# Patient Record
Sex: FEMALE | Race: WHITE | Employment: UNEMPLOYED | URBAN - NONMETROPOLITAN AREA
[De-identification: names, ages, dates, MRNs, and addresses within clinical notes are randomized per-mention and may not be internally consistent; named-entity substitution may affect disease eponyms.]

---

## 2020-08-31 LAB
BUN BLDV-MCNC: 19 MG/DL
CALCIUM SERPL-MCNC: 9.8 MG/DL
CHLORIDE BLD-SCNC: 98 MMOL/L
CO2: 32 MMOL/L
CREAT SERPL-MCNC: 1.5 MG/DL
GFR CALCULATED: 37
GLUCOSE BLD-MCNC: 121 MG/DL
POTASSIUM SERPL-SCNC: 4.8 MMOL/L
SODIUM BLD-SCNC: 138 MMOL/L

## 2020-09-01 DIAGNOSIS — R60.9 EDEMA, UNSPECIFIED TYPE: ICD-10-CM

## 2020-09-01 DIAGNOSIS — M32.9 LUPUS (HCC): ICD-10-CM

## 2020-09-01 DIAGNOSIS — N17.9 AKI (ACUTE KIDNEY INJURY) (HCC): ICD-10-CM

## 2020-09-01 RX ORDER — FERROUS SULFATE 325(65) MG
325 TABLET ORAL
COMMUNITY
End: 2020-09-11 | Stop reason: SINTOL

## 2020-09-01 RX ORDER — ATORVASTATIN CALCIUM 40 MG/1
40 TABLET, FILM COATED ORAL DAILY
COMMUNITY

## 2020-09-01 RX ORDER — ACETAMINOPHEN 325 MG/1
650 TABLET ORAL EVERY 6 HOURS PRN
COMMUNITY

## 2020-09-01 RX ORDER — LOSARTAN POTASSIUM 100 MG/1
100 TABLET ORAL DAILY
COMMUNITY

## 2020-09-01 RX ORDER — DICLOFENAC SODIUM 75 MG/1
75 TABLET, DELAYED RELEASE ORAL 2 TIMES DAILY
COMMUNITY
End: 2022-07-07 | Stop reason: ALTCHOICE

## 2020-09-01 RX ORDER — CLOPIDOGREL BISULFATE 75 MG/1
75 TABLET ORAL DAILY
COMMUNITY
End: 2021-11-09 | Stop reason: ALTCHOICE

## 2020-09-01 RX ORDER — FAMOTIDINE 20 MG/1
20 TABLET, FILM COATED ORAL 2 TIMES DAILY
COMMUNITY

## 2020-09-01 RX ORDER — INSULIN GLARGINE 100 [IU]/ML
INJECTION, SOLUTION SUBCUTANEOUS NIGHTLY
COMMUNITY
End: 2021-08-24 | Stop reason: ALTCHOICE

## 2020-09-01 RX ORDER — FUROSEMIDE 40 MG/1
40 TABLET ORAL 2 TIMES DAILY
COMMUNITY
End: 2020-09-11 | Stop reason: ALTCHOICE

## 2020-09-01 RX ORDER — NIFEDIPINE 90 MG/1
90 TABLET, FILM COATED, EXTENDED RELEASE ORAL DAILY
COMMUNITY

## 2020-09-01 RX ORDER — SUCRALFATE 1 G/1
1 TABLET ORAL 2 TIMES DAILY
COMMUNITY

## 2020-09-01 RX ORDER — PANTOPRAZOLE SODIUM 40 MG/1
40 TABLET, DELAYED RELEASE ORAL DAILY
COMMUNITY

## 2020-09-01 RX ORDER — BETAMETHASONE DIPROPIONATE 0.05 %
OINTMENT (GRAM) TOPICAL 2 TIMES DAILY
COMMUNITY

## 2020-09-01 RX ORDER — SPIRONOLACTONE 25 MG/1
25 TABLET ORAL DAILY
COMMUNITY

## 2020-09-03 LAB
BUN BLDV-MCNC: 22 MG/DL
CALCIUM SERPL-MCNC: 9.1 MG/DL
CHLORIDE BLD-SCNC: 104 MMOL/L
CO2: 32 MMOL/L
CREAT SERPL-MCNC: 1.7 MG/DL
GFR CALCULATED: 32
GLUCOSE BLD-MCNC: 248 MG/DL
POTASSIUM SERPL-SCNC: 4.8 MMOL/L
SODIUM BLD-SCNC: 147 MMOL/L

## 2020-09-11 ENCOUNTER — OFFICE VISIT (OUTPATIENT)
Dept: NEPHROLOGY | Age: 60
End: 2020-09-11
Payer: MEDICARE

## 2020-09-11 VITALS
OXYGEN SATURATION: 90 % | TEMPERATURE: 97 F | HEART RATE: 79 BPM | DIASTOLIC BLOOD PRESSURE: 76 MMHG | WEIGHT: 273 LBS | HEIGHT: 61 IN | SYSTOLIC BLOOD PRESSURE: 148 MMHG | RESPIRATION RATE: 18 BRPM | BODY MASS INDEX: 51.54 KG/M2

## 2020-09-11 PROCEDURE — 99213 OFFICE O/P EST LOW 20 MIN: CPT | Performed by: INTERNAL MEDICINE

## 2020-09-11 PROCEDURE — G8417 CALC BMI ABV UP PARAM F/U: HCPCS | Performed by: INTERNAL MEDICINE

## 2020-09-11 PROCEDURE — G8427 DOCREV CUR MEDS BY ELIG CLIN: HCPCS | Performed by: INTERNAL MEDICINE

## 2020-09-11 PROCEDURE — 1036F TOBACCO NON-USER: CPT | Performed by: INTERNAL MEDICINE

## 2020-09-11 PROCEDURE — 2022F DILAT RTA XM EVC RTNOPTHY: CPT | Performed by: INTERNAL MEDICINE

## 2020-09-11 PROCEDURE — 3046F HEMOGLOBIN A1C LEVEL >9.0%: CPT | Performed by: INTERNAL MEDICINE

## 2020-09-11 PROCEDURE — 3017F COLORECTAL CA SCREEN DOC REV: CPT | Performed by: INTERNAL MEDICINE

## 2020-09-11 RX ORDER — FLASH GLUCOSE SENSOR
1 KIT MISCELLANEOUS 4 TIMES DAILY
Qty: 2 EACH | Refills: 12 | Status: SHIPPED | OUTPATIENT
Start: 2020-09-11

## 2020-09-11 RX ORDER — FLASH GLUCOSE SCANNING READER
1 EACH MISCELLANEOUS 4 TIMES DAILY
Qty: 1 DEVICE | Refills: 2 | Status: SHIPPED | OUTPATIENT
Start: 2020-09-11

## 2020-09-11 NOTE — PROGRESS NOTES
[facility]    [address] [facility] [address]  [address], [address]  [phone]       Progress Note    9/11/2020 1:37 PM    Pt Name:    Lamar Portal:    1960  Primary Care Physician:  Ella Issa MD       Chief Complaint:   Chief Complaint   Patient presents with    Follow-Up from Hospital    Chronic Kidney Disease    Diabetes    Hypertension    Nephritis     lupus    Nephropathy    Proteinuria    Obesity        History of Chief Complaint: CKD stage G-IIIB-A1     Subjective:  I last saw the patient in [facility] 08/10/2020. I follow the patient for Chronic Kidney disease stage G-IIIB-A1. Since our last visit the patient has not been hospitalized. The patient is sleeping fairly well at night with 1-2 times per night nocturia. The patient has a good appetite and is remaining active. The patient denied N/V/C/D/SOB/CP. She has no trouble at bladder emptying. She has a lot of arthritic pain. Her last Lupus flair was recent and she has scalp and skin lesions. COVID-19 screening  Fever: none  Cough: none  Exposure: none  Shortness of breath: none    Protein/creatinine ratio:   eGFR: 32 Ml/min      Last six eGFR readings:  Lab Results   Component Value Date    LABGLOM 32 09/03/2020    LABGLOM 37 08/31/2020          Objective:  VITALS:  BP (!) 148/76 (Site: Left Upper Arm, Position: Sitting, Cuff Size: Small Adult)   Pulse 79   Temp 97 °F (36.1 °C)   Resp 18   Ht 5' 1\" (1.549 m)   Wt 273 lb (123.8 kg)   SpO2 90%   BMI 51.58 kg/m²   Weight:   Wt Readings from Last 3 Encounters:   09/11/20 273 lb (123.8 kg)   10/02/15 202 lb 3.2 oz (91.7 kg)   08/21/15 195 lb 3.2 oz (88.5 kg)     Body mass index is 51.58 kg/m². Physical examination    General:  Alert and cooperative with exam  HEENT:  Normocephalic. No lesions nor rashes. DC. EOMI  Neck:   No JVD and no bruits. Thyroid gland is normal  Lungs:  Breathing easily. No rales nor rhonchi.  No cough nor sputum production. Heart[de-identified]            RRR. No murmurs nor rubs. PMI is not enlarged nor displaced. Abdomen:  Soft and non tender. Bowel sounds are active in all four quadrants. Extremities:  No edema  Neurologic:  CN II-XII are intact. No deficits noted. Muscle strength and tone are equal throughout. Skin:                Warm and dry with no rashes. Muscles:         Hand  and leg strength are equal and strong bilaterally. Lab Data      CBC: No results found for: WBC, HGB, HCT, MCV, PLT  BMP:    Lab Results   Component Value Date     09/03/2020     08/31/2020    K 4.8 09/03/2020    K 4.8 08/31/2020     09/03/2020    CL 98 08/31/2020    CO2 32 09/03/2020    CO2 32 08/31/2020    BUN 22 09/03/2020    BUN 19 08/31/2020    CREATININE 1.7 09/03/2020    CREATININE 1.5 08/31/2020    GLUCOSE 248 09/03/2020    GLUCOSE 121 08/31/2020      Hepatic: No results found for: AST, ALT, ALB, BILITOT, ALKPHOS  BNP: No results found for: BNP  Lipids: No results found for: CHOL, HDL  INR: No results found for: INR  URINE: No results found for: NAUR, PROTUR  No results found for: NITRU, COLORU, PHUR, LABCAST, WBCUA, RBCUA, MUCUS, TRICHOMONAS, YEAST, BACTERIA, CLARITYU, SPECGRAV, LEUKOCYTESUR, UROBILINOGEN, BILIRUBINUR, BLOODU, GLUCOSEU, KETUA, AMORPHOUS   Microalbumen/Creatinine ratio:  No components found for: RUCREAT        Medications:    Current Outpatient Medications   Medication Sig Dispense Refill    Albuterol Sulfate (PROAIR HFA IN) Inhale into the lungs      acetaminophen (TYLENOL) 325 MG tablet Take 650 mg by mouth every 6 hours as needed for Pain      betamethasone dipropionate (DIPROLENE) 0.05 % ointment Apply topically 2 times daily Apply topically 2 times daily.       diclofenac sodium (VOLTAREN) 1 % GEL Apply 2 g topically 3 times daily      diclofenac (VOLTAREN) 75 MG EC tablet Take 75 mg by mouth 2 times daily      clonazePAM (KLONOPIN) 0.5 MG tablet Take 0.5 mg by mouth 2 times daily as needed      aspirin 81 MG tablet Take 81 mg by mouth daily.  escitalopram (LEXAPRO) 20 MG tablet Take 20 mg by mouth daily.  HYDROXYCHLOROQUINE SULFATE PO Take 400 mg by mouth daily      atorvastatin (LIPITOR) 40 MG tablet Take 40 mg by mouth daily      clopidogrel (PLAVIX) 75 MG tablet Take 75 mg by mouth daily      pantoprazole (PROTONIX) 40 MG tablet Take 40 mg by mouth daily      losartan (COZAAR) 100 MG tablet Take 100 mg by mouth daily      NIFEdipine (ADALAT CC) 90 MG extended release tablet Take 90 mg by mouth daily      sucralfate (CARAFATE) 1 GM tablet Take 1 g by mouth 4 times daily      spironolactone (ALDACTONE) 25 MG tablet Take 25 mg by mouth 2 times daily      famotidine (PEPCID) 20 MG tablet Take 20 mg by mouth 2 times daily      insulin lispro (HUMALOG) 100 UNIT/ML injection vial Inject into the skin 3 times daily (before meals)      Nystatin 5875815 units CAPS Take by mouth      insulin glargine (LANTUS) 100 UNIT/ML injection vial Inject into the skin nightly      meclizine (ANTIVERT) 25 MG tablet Take 25 mg by mouth as needed      traZODone (DESYREL) 100 MG tablet Take 100 mg by mouth nightly.  levocetirizine (XYZAL) 5 MG tablet Take 5 mg by mouth nightly.  LEVOTHYROXINE SODIUM 200 mg daily 200 mg and 50 mg      gabapentin (NEURONTIN) 300 MG capsule Take 400 mg by mouth 3 times daily.  FISH OIL by Does not apply route.  Multiple Vitamins-Minerals (MULTIVITAMIN PO) Take  by mouth. No current facility-administered medications for this visit. IMPRESSIONS:        Kidney disease: CKD stage G-IIIB-A1  Anemia: Anemia remains stable. No need for an erythrocyte stimulating agent (RAMONA). Bone and mineral metabolism: He has no bone pain. PLAN:  1. We discussed the eGFR today. 2. We will continue all current medications without changes. 3. Luis Armando   4. PLA2R  5. Checking protein/creatinine ratio. 6. c Peptide  7.  We will see the patient

## 2020-09-11 NOTE — PATIENT INSTRUCTIONS
KNOW YOUR KIDNEY NUMBERS    Your kidney speed (eGFR) was 32 ml/min this visit (normal is  ml/min)(Ml/min=milliliters of blood filtered per minute). The higher this number is, the better your kidney function is. Your serum creatinine was 1.7 (normal 0.8-1.2 mg/dl at most labs). The higher this number is, the worse your kidney function is. You are in stage G-IIIB-A1 of chronic kidney disease. Your kidney function has declined as compared to your last visit. Your last eGFR was  35 Ml/Min. Stages of kidney disease    EGFR (estimated glomerular filtration rate)    G-I > 90 ml/min Kidney damage with normal kidney function (blood or protein in the urine)  G-II 60-89 ml/min Normal kidney function with mild damage with or without blood or protein in the urine  G-IIIA 45-59 ml/min Mild to moderate loss of kidney function. G-IIIB 30-44 ml/min Moderate to severe loss of kidney function  G-IV 15-29 ml/min Severe loss of kidney function  G-V < 15 mlmin     May need dialysis or kidney transplant    ACR (urine albumin/creatinine ratio) (Mg/Gm)    A-1      ACR<30 Normal to mildly increased protein in the urine. A-2 ACR  Moderate increase in urine protein loss. A-3 ACR >300 Severe increase in urine protein loss    Our goal is to keep your eGFR going as fast as possible ( ml/min is normal). If your eGFR declines to 15-24 ml/min and stays there without recovery,  we will begin to educate you about dialysis or kidney transplant. We also want to keep the protein in your urine as low as possible. The leading cause of kidney disease in the world is diabetes mellitus. Keep your sugar  as much as possible. The second leading cause is hypertension. Keep Your blood pressure 120-140/70-80 as much as possible. If you need refills, call the office or your drug store. You may call the office any time with any questions or concerns.     DUE TO THE CORONAVIRUS CONCERN, PLEASE LIMIT YOUR TIME IN PUBLIC. 8 Brycee Panfilo Rodriguezidi YOUR HANDS COMPLETELY AND FREQUENTLY. Consuelo Magana

## 2020-11-19 PROBLEM — F41.1 GENERALIZED ANXIETY DISORDER: Status: ACTIVE | Noted: 2020-11-19

## 2020-11-19 PROBLEM — D68.61: Status: ACTIVE | Noted: 2017-09-03

## 2020-11-19 PROBLEM — M79.2 NEURALGIA AND NEURITIS, UNSPECIFIED: Status: ACTIVE | Noted: 2020-11-19

## 2020-11-19 PROBLEM — Z86.73 HISTORY OF CARDIOEMBOLIC CEREBROVASCULAR ACCIDENT (CVA): Status: ACTIVE | Noted: 2020-02-09

## 2020-11-19 PROBLEM — R60.9 PERIPHERAL EDEMA: Status: ACTIVE | Noted: 2020-11-19

## 2020-11-19 PROBLEM — G47.34 IDIOPATHIC SLEEP RELATED NONOBSTRUCTIVE ALVEOLAR HYPOVENTILATION: Status: ACTIVE | Noted: 2020-11-19

## 2020-11-19 PROBLEM — R59.9 ADENOPATHY: Status: ACTIVE | Noted: 2018-10-08

## 2020-11-19 PROBLEM — S00.83XA CONTUSION OF FACE: Status: ACTIVE | Noted: 2020-11-19

## 2020-11-19 PROBLEM — D68.59 HYPERCOAGULABLE STATE (HCC): Status: ACTIVE | Noted: 2017-09-03

## 2020-11-19 PROBLEM — K21.00 GASTRO-ESOPHAGEAL REFLUX DISEASE WITH ESOPHAGITIS: Status: ACTIVE | Noted: 2020-11-19

## 2020-11-19 PROBLEM — F32.A DEPRESSIVE DISORDER: Status: ACTIVE | Noted: 2020-11-19

## 2020-11-19 PROBLEM — J45.20 INTERMITTENT ASTHMA: Status: ACTIVE | Noted: 2020-11-19

## 2020-11-19 PROBLEM — I63.81 THALAMIC INFARCTION (HCC): Status: ACTIVE | Noted: 2017-09-03

## 2020-11-19 PROBLEM — Z87.891 EX-CIGARETTE SMOKER: Status: ACTIVE | Noted: 2020-11-19

## 2020-11-19 PROBLEM — S31.109A OPEN WOUND OF ANTERIOR ABDOMINAL WALL: Status: ACTIVE | Noted: 2020-01-01

## 2020-11-19 PROBLEM — E87.1 HYPONATREMIA: Status: ACTIVE | Noted: 2020-11-19

## 2020-11-19 PROBLEM — Z79.899 LONG-TERM USE OF PLAQUENIL: Status: ACTIVE | Noted: 2017-09-03

## 2020-11-19 PROBLEM — R09.89 CHOKING SENSATION: Status: ACTIVE | Noted: 2020-11-19

## 2020-11-19 PROBLEM — G47.00 INSOMNIA: Status: ACTIVE | Noted: 2020-11-19

## 2020-11-19 PROBLEM — R09.02 HYPOXIA: Status: ACTIVE | Noted: 2020-11-19

## 2020-11-19 PROBLEM — Z79.4 LONG TERM CURRENT USE OF INSULIN (HCC): Status: ACTIVE | Noted: 2020-11-19

## 2020-11-19 PROBLEM — J44.1 ACUTE EXACERBATION OF CHRONIC OBSTRUCTIVE AIRWAYS DISEASE (HCC): Status: ACTIVE | Noted: 2020-11-19

## 2020-11-19 PROBLEM — L65.9 ALOPECIA: Status: ACTIVE | Noted: 2020-02-09

## 2020-11-19 PROBLEM — R59.0 MEDIASTINAL LYMPHADENOPATHY: Status: ACTIVE | Noted: 2020-11-19

## 2020-11-19 PROBLEM — K74.60 CIRRHOSIS OF LIVER (HCC): Status: ACTIVE | Noted: 2020-11-19

## 2020-11-19 PROBLEM — J10.1 INFLUENZA DUE TO INFLUENZA A VIRUS: Status: ACTIVE | Noted: 2020-11-19

## 2020-11-19 PROBLEM — R60.1 GENERALIZED EDEMA: Status: ACTIVE | Noted: 2020-11-19

## 2020-11-19 PROBLEM — M32.9 SLE (SYSTEMIC LUPUS ERYTHEMATOSUS RELATED SYNDROME) (HCC): Status: ACTIVE | Noted: 2017-09-03

## 2020-11-19 PROBLEM — R07.89 ATYPICAL CHEST PAIN: Status: ACTIVE | Noted: 2020-11-19

## 2020-11-19 PROBLEM — R74.8 HIGH SERUM ALKALINE PHOSPHATASE LEVEL: Status: ACTIVE | Noted: 2020-11-19

## 2020-11-19 PROBLEM — N28.9 ACUTE RENAL INSUFFICIENCY: Status: ACTIVE | Noted: 2020-11-19

## 2020-11-19 PROBLEM — R93.89 ABNORMAL CT SCAN, CHEST: Status: ACTIVE | Noted: 2018-10-08
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
## 2020-11-19 PROBLEM — R06.00 DYSPNEA, UNSPECIFIED: Status: ACTIVE | Noted: 2020-11-19

## 2020-11-19 PROBLEM — J18.9 COMMUNITY ACQUIRED PNEUMONIA: Status: ACTIVE | Noted: 2020-11-19

## 2020-11-19 PROBLEM — Z72.0 TOBACCO USER: Status: ACTIVE | Noted: 2020-11-19

## 2020-11-19 PROBLEM — R16.0 LIVER MASS: Status: ACTIVE | Noted: 2020-11-19

## 2020-11-19 PROBLEM — Z78.0 POSTMENOPAUSAL STATE: Status: ACTIVE | Noted: 2020-11-19

## 2020-11-19 PROBLEM — N28.9 DISORDER OF KIDNEY AND URETER, UNSPECIFIED: Status: ACTIVE | Noted: 2020-11-19

## 2020-11-19 PROBLEM — D64.9 ANEMIA: Status: ACTIVE | Noted: 2020-11-19

## 2020-11-19 PROBLEM — M79.2 PERIPHERAL NEUROPATHIC PAIN: Status: ACTIVE | Noted: 2020-11-19
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
## 2021-01-08 LAB
BASOPHILS ABSOLUTE: ABNORMAL
BASOPHILS RELATIVE PERCENT: ABNORMAL
BUN BLDV-MCNC: 16 MG/DL
C-PEPTIDE: 0.7
CALCIUM SERPL-MCNC: 9.4 MG/DL
CHLORIDE BLD-SCNC: 102 MMOL/L
CO2: 30 MMOL/L
CREAT SERPL-MCNC: 1.2 MG/DL
EOSINOPHILS ABSOLUTE: ABNORMAL
EOSINOPHILS RELATIVE PERCENT: ABNORMAL
GFR CALCULATED: 48
GLUCOSE BLD-MCNC: 108 MG/DL
HCT VFR BLD CALC: 36.4 % (ref 36–46)
HEMOGLOBIN: 10 G/DL (ref 12–16)
LYMPHOCYTES ABSOLUTE: ABNORMAL
LYMPHOCYTES RELATIVE PERCENT: ABNORMAL
MCH RBC QN AUTO: ABNORMAL PG
MCHC RBC AUTO-ENTMCNC: ABNORMAL G/DL
MCV RBC AUTO: ABNORMAL FL
MONOCYTES ABSOLUTE: ABNORMAL
MONOCYTES RELATIVE PERCENT: ABNORMAL
NEUTROPHILS ABSOLUTE: ABNORMAL
NEUTROPHILS RELATIVE PERCENT: ABNORMAL
PLATELET # BLD: 264 K/ΜL
PMV BLD AUTO: ABNORMAL FL
POTASSIUM SERPL-SCNC: 4.3 MMOL/L
RBC # BLD: 4.1 10^6/ΜL
SODIUM BLD-SCNC: 141 MMOL/L
WBC # BLD: 10.02 10^3/ML

## 2021-02-19 ENCOUNTER — OFFICE VISIT (OUTPATIENT)
Dept: NEPHROLOGY | Age: 61
End: 2021-02-19
Payer: MEDICARE

## 2021-02-19 VITALS
BODY MASS INDEX: 52.87 KG/M2 | TEMPERATURE: 97.2 F | HEIGHT: 61 IN | HEART RATE: 82 BPM | WEIGHT: 280 LBS | RESPIRATION RATE: 18 BRPM | OXYGEN SATURATION: 91 % | SYSTOLIC BLOOD PRESSURE: 118 MMHG | DIASTOLIC BLOOD PRESSURE: 72 MMHG

## 2021-02-19 DIAGNOSIS — N18.32 STAGE 3B CHRONIC KIDNEY DISEASE (HCC): Primary | ICD-10-CM

## 2021-02-19 PROCEDURE — G8427 DOCREV CUR MEDS BY ELIG CLIN: HCPCS | Performed by: INTERNAL MEDICINE

## 2021-02-19 PROCEDURE — 3017F COLORECTAL CA SCREEN DOC REV: CPT | Performed by: INTERNAL MEDICINE

## 2021-02-19 PROCEDURE — G8417 CALC BMI ABV UP PARAM F/U: HCPCS | Performed by: INTERNAL MEDICINE

## 2021-02-19 PROCEDURE — 99214 OFFICE O/P EST MOD 30 MIN: CPT | Performed by: INTERNAL MEDICINE

## 2021-02-19 PROCEDURE — 1036F TOBACCO NON-USER: CPT | Performed by: INTERNAL MEDICINE

## 2021-02-19 PROCEDURE — G8482 FLU IMMUNIZE ORDER/ADMIN: HCPCS | Performed by: INTERNAL MEDICINE

## 2021-02-19 NOTE — PROGRESS NOTES
[facility]    [address] [facility]  [address], [address] [facility]  [phone]       Progress Note    2/19/2021 2:08 PM    Pt Name:    Gildardo Torres:    1960  Primary Care Physician:  Jody Alarcon MD       Chief Complaint:   Chief Complaint   Patient presents with    Chronic Kidney Disease    Anemia    Diabetes    Hematuria    Hypertension    Nephritis    Lupus     [facility]    Nephropathy    Obesity    Proteinuria        History of Chief Complaint: CKD stage G-IIIB-A1 from: DM, HTN, Obesity, Lupus     Subjective:  I last saw the patient in clinic 09/11/2020. I follow the patient for Chronic Kidney disease stage G-IIIB-A1. Since our last visit the patient has not been hospitalized. The patient is sleeping well at night with 1-2 times per night nocturia. The patient has a good appetite and is remaining active. The patient denied N/V/C/D/SOB/CP. She has no trouble at bladder emptying. PLA2R was 1.8. C peptide was 0.7. She has been receiving IV iron. She still has lupus lesions and is receiving topical therapy and hydroxychloroquine. COVID-19 screening  Fever: none  Cough: none  Exposure: none  Shortness of breath: none    Protein/creatinine ratio: 0.4  eGFR: 48 Ml/min (it was 32 Ml/Min last visit)      Last six eGFR readings:  Lab Results   Component Value Date    LABGLOM 48 01/08/2021    LABGLOM 32 09/03/2020    LABGLOM 37 08/31/2020          Objective:  VITALS:  /72 (Site: Left Upper Arm, Position: Sitting, Cuff Size: Small Adult)   Pulse 82   Temp 97.2 °F (36.2 °C)   Resp 18   Ht 5' 1\" (1.549 m)   Wt 280 lb (127 kg)   SpO2 91%   BMI 52.91 kg/m²   Weight:   Wt Readings from Last 3 Encounters:   02/19/21 280 lb (127 kg)   09/11/20 273 lb (123.8 kg)   10/02/15 202 lb 3.2 oz (91.7 kg)     Body mass index is 52.91 kg/m².      Physical examination    General:  Alert and cooperative with exam HEENT:  Normocephalic. No lesions nor rashes. DC. EOMI  Neck:   No JVD and no bruits. Thyroid gland is normal  Lungs:  Breathing easily. No rales nor rhonchi. No cough nor sputum production. Heart[de-identified]            RRR. No murmurs nor rubs. PMI is not enlarged nor displaced. Abdomen:  Soft and non tender. Bowel sounds are active in all four quadrants. Extremities:  1+ edema  Neurologic:  CN II-XII are intact. No deficits noted. Muscle strength and tone are equal throughout. Skin:                Warm and dry with no rashes. Muscles:         Hand  and leg strength are equal and strong bilaterally.      Lab Data      CBC:   Lab Results   Component Value Date    WBC 10.02 01/08/2021    HGB 10.0 (A) 01/08/2021    HCT 36.4 01/08/2021     01/08/2021     BMP:    Lab Results   Component Value Date     01/08/2021     09/03/2020     08/31/2020    K 4.3 01/08/2021    K 4.8 09/03/2020    K 4.8 08/31/2020     01/08/2021     09/03/2020    CL 98 08/31/2020    CO2 30 01/08/2021    CO2 32 09/03/2020    CO2 32 08/31/2020    BUN 16 01/08/2021    BUN 22 09/03/2020    BUN 19 08/31/2020    CREATININE 1.2 01/08/2021    CREATININE 1.7 09/03/2020    CREATININE 1.5 08/31/2020    GLUCOSE 108 01/08/2021    GLUCOSE 248 09/03/2020    GLUCOSE 121 08/31/2020      Hepatic: No results found for: AST, ALT, ALB, BILITOT, ALKPHOS  BNP: No results found for: BNP  Lipids: No results found for: CHOL, HDL  INR: No results found for: INR  URINE: No results found for: NAUR, PROTUR  No results found for: NITRU, COLORU, PHUR, LABCAST, WBCUA, RBCUA, MUCUS, TRICHOMONAS, YEAST, BACTERIA, CLARITYU, SPECGRAV, LEUKOCYTESUR, UROBILINOGEN, BILIRUBINUR, BLOODU, GLUCOSEU, KETUA, AMORPHOUS   Microalbumen/Creatinine ratio:  No components found for: RUCREAT        Medications:    Current Outpatient Medications   Medication Sig Dispense Refill    Albuterol Sulfate (PROAIR HFA IN) Inhale into the lungs  Continuous Blood Gluc Sensor (FREESTYLE KEILA 14 DAY SENSOR) MISC 1 Units by Does not apply route 4 times daily 2 each 12    Continuous Blood Gluc  (FREESTYLE KEILA 14 DAY READER) RADHA 1 Units by Does not apply route 4 times daily 1 Device 2    HYDROXYCHLOROQUINE SULFATE PO Take 400 mg by mouth daily      acetaminophen (TYLENOL) 325 MG tablet Take 650 mg by mouth every 6 hours as needed for Pain      atorvastatin (LIPITOR) 40 MG tablet Take 40 mg by mouth daily      clopidogrel (PLAVIX) 75 MG tablet Take 75 mg by mouth daily      pantoprazole (PROTONIX) 40 MG tablet Take 40 mg by mouth daily      losartan (COZAAR) 100 MG tablet Take 100 mg by mouth daily      NIFEdipine (ADALAT CC) 90 MG extended release tablet Take 90 mg by mouth daily      betamethasone dipropionate (DIPROLENE) 0.05 % ointment Apply topically 2 times daily Apply topically 2 times daily.  diclofenac sodium (VOLTAREN) 1 % GEL Apply 2 g topically 3 times daily      sucralfate (CARAFATE) 1 GM tablet Take 1 g by mouth 4 times daily      spironolactone (ALDACTONE) 25 MG tablet Take 25 mg by mouth 2 times daily      famotidine (PEPCID) 20 MG tablet Take 20 mg by mouth 2 times daily      insulin lispro (HUMALOG) 100 UNIT/ML injection vial Inject into the skin 3 times daily (before meals)      Nystatin 0864675 units CAPS Take by mouth      insulin glargine (LANTUS) 100 UNIT/ML injection vial Inject into the skin nightly      diclofenac (VOLTAREN) 75 MG EC tablet Take 75 mg by mouth 2 times daily      clonazePAM (KLONOPIN) 0.5 MG tablet Take 0.5 mg by mouth 2 times daily as needed      meclizine (ANTIVERT) 25 MG tablet Take 25 mg by mouth as needed      aspirin 81 MG tablet Take 81 mg by mouth daily.  traZODone (DESYREL) 100 MG tablet Take 100 mg by mouth nightly.  levocetirizine (XYZAL) 5 MG tablet Take 5 mg by mouth nightly.  escitalopram (LEXAPRO) 20 MG tablet Take 20 mg by mouth daily.

## 2021-02-19 NOTE — PATIENT INSTRUCTIONS
DUE TO THE CORONAVIRUS CONCERN, PLEASE LIMIT YOUR TIME IN PUBLIC. 8 Maia Rodriguezidi YOUR HANDS COMPLETELY AND FREQUENTLY. Bob Chiang

## 2021-06-21 PROBLEM — R09.02 HYPOXEMIA: Status: ACTIVE | Noted: 2021-06-21

## 2021-06-21 PROBLEM — R60.9 BODY FLUID RETENTION: Status: ACTIVE | Noted: 2021-06-21

## 2021-06-21 PROBLEM — E03.9 ACQUIRED HYPOTHYROIDISM: Status: ACTIVE | Noted: 2021-06-21

## 2021-06-21 PROBLEM — Z99.81 DEPENDENCE ON SUPPLEMENTAL OXYGEN: Status: ACTIVE | Noted: 2021-06-21

## 2021-06-21 PROBLEM — I50.9 CONGESTIVE HEART FAILURE (HCC): Status: ACTIVE | Noted: 2021-06-21

## 2021-06-21 PROBLEM — K92.2 GASTROINTESTINAL HEMORRHAGE: Status: ACTIVE | Noted: 2021-06-21

## 2021-06-21 PROBLEM — F33.41 RECURRENT MAJOR DEPRESSION IN PARTIAL REMISSION (HCC): Status: ACTIVE | Noted: 2021-06-21

## 2021-06-22 LAB
BASOPHILS ABSOLUTE: ABNORMAL
BASOPHILS RELATIVE PERCENT: ABNORMAL
BUN BLDV-MCNC: 18 MG/DL
CALCIUM SERPL-MCNC: 8.8 MG/DL
CHLORIDE BLD-SCNC: 101 MMOL/L
CO2: 36 MMOL/L
CREAT SERPL-MCNC: 1.5 MG/DL
EOSINOPHILS ABSOLUTE: ABNORMAL
EOSINOPHILS RELATIVE PERCENT: ABNORMAL
GFR CALCULATED: 37
GLUCOSE BLD-MCNC: 212 MG/DL
HCT VFR BLD CALC: 34.4 % (ref 36–46)
HEMOGLOBIN: 9.8 G/DL (ref 12–16)
LYMPHOCYTES ABSOLUTE: ABNORMAL
LYMPHOCYTES RELATIVE PERCENT: ABNORMAL
MCH RBC QN AUTO: ABNORMAL PG
MCHC RBC AUTO-ENTMCNC: ABNORMAL G/DL
MCV RBC AUTO: ABNORMAL FL
MONOCYTES ABSOLUTE: ABNORMAL
MONOCYTES RELATIVE PERCENT: ABNORMAL
NEUTROPHILS ABSOLUTE: ABNORMAL
NEUTROPHILS RELATIVE PERCENT: ABNORMAL
PLATELET # BLD: 315 K/ΜL
PMV BLD AUTO: ABNORMAL FL
POTASSIUM SERPL-SCNC: 4.4 MMOL/L
RBC # BLD: 4.22 10^6/ΜL
SODIUM BLD-SCNC: 141 MMOL/L
WBC # BLD: 12.37 10^3/ML

## 2021-08-24 ENCOUNTER — OFFICE VISIT (OUTPATIENT)
Dept: NEPHROLOGY | Age: 61
End: 2021-08-24
Payer: MEDICARE

## 2021-08-24 VITALS — BODY MASS INDEX: 52.91 KG/M2 | WEIGHT: 280 LBS

## 2021-08-24 DIAGNOSIS — N18.32 STAGE 3B CHRONIC KIDNEY DISEASE (HCC): Primary | ICD-10-CM

## 2021-08-24 PROCEDURE — G8417 CALC BMI ABV UP PARAM F/U: HCPCS | Performed by: INTERNAL MEDICINE

## 2021-08-24 PROCEDURE — 1036F TOBACCO NON-USER: CPT | Performed by: INTERNAL MEDICINE

## 2021-08-24 PROCEDURE — 99214 OFFICE O/P EST MOD 30 MIN: CPT | Performed by: INTERNAL MEDICINE

## 2021-08-24 PROCEDURE — 3017F COLORECTAL CA SCREEN DOC REV: CPT | Performed by: INTERNAL MEDICINE

## 2021-08-24 PROCEDURE — G8428 CUR MEDS NOT DOCUMENT: HCPCS | Performed by: INTERNAL MEDICINE

## 2021-08-24 NOTE — PROGRESS NOTES
[facility]    [address] [facility] [address]  [address], [address]  [phone]       Progress Note  Via Telemedicine    8/24/2021 2:06 PM    Pt Name:    Silvino Pitts  YOB: 1960  Primary Care Physician:  Destiny Lopez MD       Chief Complaint:   Chief Complaint   Patient presents with    Chronic Kidney Disease    Anemia    Diabetes    Hematuria    Hypertension    Lupus     [facility]     Nephritis    Obesity    Proteinuria        History of Chief Complaint: CKD stage G-IIIB-A1 from: DM, HTN, Obesity, Lupus     Subjective:  I last saw the patient in clinic 02/19/2021. I follow the patient for Chronic Kidney disease stage G-IIIB-A1. Since our last visit the patient has not been hospitalized. The patient is sleeping well at night with 1-2 times per night nocturia. The patient has a fair appetite and is remaining active. The patient denied N/V/C/CP. She has no trouble at bladder emptying. She feels weak and short of breath. COVID-19 screening  Fever: none  Cough: none  Exposure: none  Shortness of breath: none    Micro albumin/creatinine ratio: 0.3  eGFR: 37 Ml/min (it was 48 Ml/Min last visit)  SCr: 1.5 Mg/Dl (It was 1.2 Mg/Dl last visit)      Last six eGFR readings:  Lab Results   Component Value Date    LABGLOM 37 06/22/2021    LABGLOM 48 01/08/2021    LABGLOM 32 09/03/2020    LABGLOM 37 08/31/2020          Objective:  VITALS:  Wt 280 lb (127 kg)   BMI 52.91 kg/m²   Weight:   Wt Readings from Last 3 Encounters:   08/24/21 280 lb (127 kg)   02/19/21 280 lb (127 kg)   09/11/20 273 lb (123.8 kg)     Body mass index is 52.91 kg/m². Physical examination    General:  Alert and cooperative with exam  HEENT:  Normal per patient  Neck:              Normal per patient  Lungs:  Short of breath.  No cough  Heart[de-identified]            No chest pain  Abdomen:  Diarrhea for 10-14 days  Extremities:  3+ edema shown per her cell phone  Neurologic:  Normal per patient  Skin: Warm and dry with no rashes. Muscles:         Hand  and leg strength are equal and strong bilaterally. 2021    TELEHEALTH EVALUATION -- Audio/Visual (During TCCIB-66 public health emergency)    HPI:    Art Wen (:  1960) has requested an audio/video evaluation for the following concern(s):    CKD stage G-IIIB-A1    Review of Systems    Prior to Visit Medications    Medication Sig Taking? Authorizing Provider   Albuterol Sulfate (PROAIR HFA IN) Inhale into the lungs Yes Historical Provider, MD   Continuous Blood Gluc Sensor (FREESTYLE KEILA 14 DAY SENSOR) MISC 1 Units by Does not apply route 4 times daily Yes Arjun Robert DO   Continuous Blood Gluc  (FREESTYLE KEILA 14 DAY READER) RADHA 1 Units by Does not apply route 4 times daily Yes Arjun Robert DO   HYDROXYCHLOROQUINE SULFATE PO Take 400 mg by mouth daily Yes Historical Provider, MD   acetaminophen (TYLENOL) 325 MG tablet Take 650 mg by mouth every 6 hours as needed for Pain Yes Historical Provider, MD   atorvastatin (LIPITOR) 40 MG tablet Take 40 mg by mouth daily Yes Historical Provider, MD   clopidogrel (PLAVIX) 75 MG tablet Take 75 mg by mouth daily Yes Historical Provider, MD   pantoprazole (PROTONIX) 40 MG tablet Take 40 mg by mouth daily Yes Historical Provider, MD   losartan (COZAAR) 100 MG tablet Take 100 mg by mouth daily Yes Historical Provider, MD   NIFEdipine (ADALAT CC) 90 MG extended release tablet Take 90 mg by mouth daily Yes Historical Provider, MD   betamethasone dipropionate (DIPROLENE) 0.05 % ointment Apply topically 2 times daily Apply topically 2 times daily.  Yes Historical Provider, MD   diclofenac sodium (VOLTAREN) 1 % GEL Apply 2 g topically 3 times daily Yes Historical Provider, MD   sucralfate (CARAFATE) 1 GM tablet Take 1 g by mouth 4 times daily Yes Historical Provider, MD   spironolactone (ALDACTONE) 25 MG tablet Take 25 mg by mouth 2 times daily Yes Historical Provider, MD   famotidine (PEPCID) 20 MG tablet Take 20 mg by mouth 2 times daily Yes Historical Provider, MD   Nystatin 3217775 units CAPS Take by mouth Yes Historical Provider, MD   diclofenac (VOLTAREN) 75 MG EC tablet Take 75 mg by mouth 2 times daily Yes Historical Provider, MD   clonazePAM (KLONOPIN) 0.5 MG tablet Take 0.5 mg by mouth 2 times daily as needed Yes Historical Provider, MD   meclizine (ANTIVERT) 25 MG tablet Take 25 mg by mouth as needed Yes Historical Provider, MD   aspirin 81 MG tablet Take 81 mg by mouth daily. Yes Historical Provider, MD   traZODone (DESYREL) 100 MG tablet Take 100 mg by mouth nightly. Yes Historical Provider, MD   levocetirizine (XYZAL) 5 MG tablet Take 5 mg by mouth nightly. Yes Historical Provider, MD   escitalopram (LEXAPRO) 20 MG tablet Take 20 mg by mouth daily. Yes Historical Provider, MD   LEVOTHYROXINE SODIUM 200 mg daily 200 mg and 50 mg Yes Historical Provider, MD   gabapentin (NEURONTIN) 300 MG capsule Take 400 mg by mouth 3 times daily. Yes Historical Provider, MD   Multiple Vitamins-Minerals (MULTIVITAMIN PO) Take  by mouth. Yes Historical Provider, MD       Social History     Tobacco Use    Smoking status: Former Smoker     Packs/day: 1.00     Years: 35.00     Pack years: 35.00     Types: Cigarettes     Quit date: 2016     Years since quittin.1    Smokeless tobacco: Never Used   Substance Use Topics    Alcohol use: No     Alcohol/week: 0.0 standard drinks    Drug use:  No            PHYSICAL EXAMINATION:  [ INSTRUCTIONS:  \"[x]\" Indicates a positive item  \"[]\" Indicates a negative item  -- DELETE ALL ITEMS NOT EXAMINED]  Vital Signs: (As obtained by patient/caregiver or practitioner observation)    Blood pressure-  Heart rate- 88   Respiratory rate- 18   Temperature- 96.6 Pulse oximetry-     Constitutional: [x] Appears well-developed and well-nourished [x] No apparent distress      [] Abnormal-   Mental status  [x] Alert and awake  [x] Oriented to person/place/time [x]Able to follow commands      Eyes:  EOM    [x]  Normal  [] Abnormal-  Sclera  [x]  Normal  [] Abnormal -         Discharge [x]  None visible  [] Abnormal -    HENT:   [x] Normocephalic, atraumatic. [] Abnormal   [x] Mouth/Throat: Mucous membranes are moist.     External Ears [x] Normal  [] Abnormal-     Neck: [x] No visualized mass     Pulmonary/Chest: [] Respiratory effort normal.  [x] No visualized signs of difficulty breathing or respiratory distress        [x] Abnormal-      Musculoskeletal:   [x] Normal gait with no signs of ataxia         [x] Normal range of motion of neck        [] Abnormal-       Neurological:        [x] No Facial Asymmetry (Cranial nerve 7 motor function) (limited exam to video visit)          [x] No gaze palsy        [] Abnormal-         Skin:        [x] No significant exanthematous lesions or discoloration noted on facial skin         [] Abnormal-            Psychiatric:       [x] Normal Affect [x] No Hallucinations        [] Abnormal-     Other pertinent observable physical exam findings-     ASSESSMENT/PLAN:  There are no diagnoses linked to this encounter. No follow-ups on file. Eliecer Donovan, was evaluated through a synchronous (real-time) audio-video encounter. The patient (or guardian if applicable) is aware that this is a billable service. Verbal consent to proceed has been obtained within the past 12 months. The visit was conducted pursuant to the emergency declaration under the [address] [place] [address], [address] [place] [address] authority and the [employer] General Act. Patient identification was verified, and a caregiver was present when appropriate. The patient was located in a state where the provider was credentialed to provide care. Total time spent on this encounter: 20 minutes    --Jp Quintero DO on 8/24/2021 at 2:09 PM    An electronic signature was used to authenticate this note.        Lab Data      CBC:   Lab Results   Component Value Date    WBC 12.37 06/22/2021    HGB 9.8 (A) 06/22/2021    HCT 34.4 (A) 06/22/2021     06/22/2021     BMP:    Lab Results   Component Value Date     06/22/2021     01/08/2021     09/03/2020    K 4.4 06/22/2021    K 4.3 01/08/2021    K 4.8 09/03/2020     06/22/2021     01/08/2021     09/03/2020    CO2 36 06/22/2021    CO2 30 01/08/2021    CO2 32 09/03/2020    BUN 18 06/22/2021    BUN 16 01/08/2021    BUN 22 09/03/2020    CREATININE 1.5 06/22/2021    CREATININE 1.2 01/08/2021    CREATININE 1.7 09/03/2020    GLUCOSE 212 06/22/2021    GLUCOSE 108 01/08/2021    GLUCOSE 248 09/03/2020      Hepatic: No results found for: AST, ALT, ALB, BILITOT, ALKPHOS  BNP: No results found for: BNP  Lipids: No results found for: CHOL, HDL  INR: No results found for: INR  URINE: No results found for: NAUR, PROTUR  No results found for: NITRU, COLORU, PHUR, LABCAST, WBCUA, RBCUA, MUCUS, TRICHOMONAS, YEAST, BACTERIA, CLARITYU, SPECGRAV, LEUKOCYTESUR, UROBILINOGEN, BILIRUBINUR, BLOODU, GLUCOSEU, KETUA, AMORPHOUS   Microalbumen/Creatinine ratio:  No components found for: RUCREAT        Medications:    Current Outpatient Medications   Medication Sig Dispense Refill    Albuterol Sulfate (PROAIR HFA IN) Inhale into the lungs      Continuous Blood Gluc Sensor (FREESTYLE KEILA 14 DAY SENSOR) MISC 1 Units by Does not apply route 4 times daily 2 each 12    Continuous Blood Gluc  (FREESTYLE KEILA 14 DAY READER) RADHA 1 Units by Does not apply route 4 times daily 1 Device 2    HYDROXYCHLOROQUINE SULFATE PO Take 400 mg by mouth daily      acetaminophen (TYLENOL) 325 MG tablet Take 650 mg by mouth every 6 hours as needed for Pain      atorvastatin (LIPITOR) 40 MG tablet Take 40 mg by mouth daily      clopidogrel (PLAVIX) 75 MG tablet Take 75 mg by mouth daily      pantoprazole (PROTONIX) 40 MG tablet Take 40 mg by mouth daily      losartan (COZAAR) 100 MG tablet Take 100 mg by mouth daily      NIFEdipine (ADALAT CC) 90 MG extended release tablet Take 90 mg by mouth daily      betamethasone dipropionate (DIPROLENE) 0.05 % ointment Apply topically 2 times daily Apply topically 2 times daily.  diclofenac sodium (VOLTAREN) 1 % GEL Apply 2 g topically 3 times daily      sucralfate (CARAFATE) 1 GM tablet Take 1 g by mouth 4 times daily      spironolactone (ALDACTONE) 25 MG tablet Take 25 mg by mouth 2 times daily      famotidine (PEPCID) 20 MG tablet Take 20 mg by mouth 2 times daily      Nystatin [id number] units CAPS Take by mouth      diclofenac (VOLTAREN) 75 MG EC tablet Take 75 mg by mouth 2 times daily      clonazePAM (KLONOPIN) 0.5 MG tablet Take 0.5 mg by mouth 2 times daily as needed      meclizine (ANTIVERT) 25 MG tablet Take 25 mg by mouth as needed      aspirin 81 MG tablet Take 81 mg by mouth daily.  traZODone (DESYREL) 100 MG tablet Take 100 mg by mouth nightly.  levocetirizine (XYZAL) 5 MG tablet Take 5 mg by mouth nightly.  escitalopram (LEXAPRO) 20 MG tablet Take 20 mg by mouth daily.  LEVOTHYROXINE SODIUM 200 mg daily 200 mg and 50 mg      gabapentin (NEURONTIN) 300 MG capsule Take 400 mg by mouth 3 times daily.  Multiple Vitamins-Minerals (MULTIVITAMIN PO) Take  by mouth. No current facility-administered medications for this visit. IMPRESSIONS:        Kidney disease: CKD stage G-IIIB-A1  Diarrhea  Acute kidney injury  Anemia: Anemia remains stable. No need for an erythrocyte stimulating agent (RAMONA). Bone and mineral metabolism: There is no complaint of bone pain. PLAN:  1. We discussed the eGFR today. 2. We will continue all current medications without changes. 3. She will go directly to ER to evaluate acute kidney injury and diarrhea  4. Checking urine microalbumin/creatinine ratio. 5. We will see the patient back in 2 months.       _________________________________  Aleksey [place].  Nikita Curry, DO  [facility]      Vivian Goldberg, MD

## 2021-08-24 NOTE — PATIENT INSTRUCTIONS

## 2021-10-20 LAB
BASOPHILS ABSOLUTE: ABNORMAL
BASOPHILS RELATIVE PERCENT: ABNORMAL
BUN BLDV-MCNC: 14 MG/DL
CALCIUM SERPL-MCNC: 8.5 MG/DL
CHLORIDE BLD-SCNC: 103 MMOL/L
CO2: 29 MMOL/L
CREAT SERPL-MCNC: 1.2 MG/DL
CREATININE, URINE: 70
EOSINOPHILS ABSOLUTE: ABNORMAL
EOSINOPHILS RELATIVE PERCENT: ABNORMAL
GFR CALCULATED: 48
GLUCOSE BLD-MCNC: 231 MG/DL
HCT VFR BLD CALC: 34.3 % (ref 36–46)
HEMOGLOBIN: 9.5 G/DL (ref 12–16)
LYMPHOCYTES ABSOLUTE: ABNORMAL
LYMPHOCYTES RELATIVE PERCENT: ABNORMAL
MCH RBC QN AUTO: ABNORMAL PG
MCHC RBC AUTO-ENTMCNC: ABNORMAL G/DL
MCV RBC AUTO: ABNORMAL FL
MICROALBUMIN/CREAT 24H UR: 8.3 MG/G{CREAT}
MICROALBUMIN/CREAT UR-RTO: 11.9
MONOCYTES ABSOLUTE: ABNORMAL
MONOCYTES RELATIVE PERCENT: ABNORMAL
NEUTROPHILS ABSOLUTE: ABNORMAL
NEUTROPHILS RELATIVE PERCENT: ABNORMAL
PLATELET # BLD: 269 K/ΜL
PMV BLD AUTO: ABNORMAL FL
POTASSIUM SERPL-SCNC: 4.9 MMOL/L
RBC # BLD: 4.02 10^6/ΜL
SODIUM BLD-SCNC: 139 MMOL/L
WBC # BLD: 6.84 10^3/ML

## 2021-11-09 ENCOUNTER — OFFICE VISIT (OUTPATIENT)
Dept: NEPHROLOGY | Age: 61
End: 2021-11-09
Payer: MEDICARE

## 2021-11-09 VITALS
RESPIRATION RATE: 18 BRPM | HEART RATE: 86 BPM | DIASTOLIC BLOOD PRESSURE: 64 MMHG | HEIGHT: 61 IN | BODY MASS INDEX: 43.23 KG/M2 | WEIGHT: 229 LBS | OXYGEN SATURATION: 92 % | SYSTOLIC BLOOD PRESSURE: 118 MMHG

## 2021-11-09 DIAGNOSIS — K92.1 BLOOD IN STOOL: ICD-10-CM

## 2021-11-09 DIAGNOSIS — N18.32 STAGE 3B CHRONIC KIDNEY DISEASE (HCC): Primary | ICD-10-CM

## 2021-11-09 PROCEDURE — G8484 FLU IMMUNIZE NO ADMIN: HCPCS | Performed by: INTERNAL MEDICINE

## 2021-11-09 PROCEDURE — 3017F COLORECTAL CA SCREEN DOC REV: CPT | Performed by: INTERNAL MEDICINE

## 2021-11-09 PROCEDURE — 99214 OFFICE O/P EST MOD 30 MIN: CPT | Performed by: INTERNAL MEDICINE

## 2021-11-09 PROCEDURE — G8427 DOCREV CUR MEDS BY ELIG CLIN: HCPCS | Performed by: INTERNAL MEDICINE

## 2021-11-09 PROCEDURE — G8417 CALC BMI ABV UP PARAM F/U: HCPCS | Performed by: INTERNAL MEDICINE

## 2021-11-09 PROCEDURE — 1036F TOBACCO NON-USER: CPT | Performed by: INTERNAL MEDICINE

## 2021-11-09 RX ORDER — TRIAMCINOLONE ACETONIDE 0.25 MG/G
CREAM TOPICAL
Qty: 45 G | Refills: 5 | Status: SHIPPED | OUTPATIENT
Start: 2021-11-09

## 2021-11-09 NOTE — PROGRESS NOTES
[facility]    [address] [facility] [address]  [address], [address]  [phone]       Progress Note    11/9/2021 1:51 PM    Pt Name:    Reinaldo Bradshaw:    1960  Primary Care Physician:  Carine Juarez MD       Chief Complaint:   Chief Complaint   Patient presents with    Chronic Kidney Disease    Diabetes    Anemia    Hypertension    Lupus     [facility]    Nephropathy    Obesity    Proteinuria    Other     chronic, watery diarrhea    Other     recent COVID-19 with hospitilization. History of Chief Complaint: CKD stage G-IIIB-A1 from: DM, HTN, Obesity, Lupus     Subjective:  I last saw the patient in clinic 08/24/2021. I follow the patient for Chronic Kidney disease stage G-IIIB-A1. Since our last visit the patient has been hospitalized with COVID-19 at [facility]. She had blood stools and anemia requiring 2 unit PRBC. The patient is sleeping well at night with 1-2 times per night nocturia. The patient has a good appetite and is remaining active. The patient denied N/V/C/D/SOB/CP. She has no trouble at bladder emptying. She showed me lesions on her scalp and eyebrows that fluoresced under my pocket black light.  This suggests yeast.    COVID-19 screening  Fever: none  Cough: none  Exposure: none  Shortness of breath: none    Albumin/creatinine ratio:   eGFR: 48 Ml/min (it was 37 Ml/Min last visit)  SCr: 1.2 Mg/Dl (It was 1.5 Mg/Dl last visit)      Last six eGFR readings:  Lab Results   Component Value Date    LABGLOM 48 10/20/2021    LABGLOM 37 06/22/2021    LABGLOM 48 01/08/2021    LABGLOM 32 09/03/2020    LABGLOM 37 08/31/2020          Objective:  VITALS:  /64 (Site: Left Lower Arm, Position: Sitting, Cuff Size: Small Adult)   Pulse 86   Resp 18   Ht 5' 1\" (1.549 m)   Wt 229 lb (103.9 kg)   SpO2 92% Comment: oxygen  BMI 43.27 kg/m²   Weight:   Wt Readings from Last 3 Encounters:   11/09/21 229 lb (103.9 kg)   08/24/21 280 lb (127 kg)   02/19/21 280 lb Medication Sig Dispense Refill    Albuterol Sulfate (PROAIR HFA IN) Inhale into the lungs      Continuous Blood Gluc Sensor (FREESTYLE KEILA 14 DAY SENSOR) MISC 1 Units by Does not apply route 4 times daily 2 each 12    Continuous Blood Gluc  (FREESTYLE KEILA 14 DAY READER) RADHA 1 Units by Does not apply route 4 times daily 1 Device 2    HYDROXYCHLOROQUINE SULFATE PO Take 400 mg by mouth daily      acetaminophen (TYLENOL) 325 MG tablet Take 650 mg by mouth every 6 hours as needed for Pain      atorvastatin (LIPITOR) 40 MG tablet Take 40 mg by mouth daily      pantoprazole (PROTONIX) 40 MG tablet Take 40 mg by mouth daily      losartan (COZAAR) 100 MG tablet Take 100 mg by mouth daily      NIFEdipine (ADALAT CC) 90 MG extended release tablet Take 90 mg by mouth daily      betamethasone dipropionate (DIPROLENE) 0.05 % ointment Apply topically 2 times daily Apply topically 2 times daily.  diclofenac sodium (VOLTAREN) 1 % GEL Apply 2 g topically 3 times daily      sucralfate (CARAFATE) 1 GM tablet Take 1 g by mouth 4 times daily      spironolactone (ALDACTONE) 25 MG tablet Take 25 mg by mouth 2 times daily      famotidine (PEPCID) 20 MG tablet Take 20 mg by mouth 2 times daily      Nystatin [id number] units CAPS Take by mouth      diclofenac (VOLTAREN) 75 MG EC tablet Take 75 mg by mouth 2 times daily      clonazePAM (KLONOPIN) 0.5 MG tablet Take 0.5 mg by mouth 2 times daily as needed      meclizine (ANTIVERT) 25 MG tablet Take 25 mg by mouth as needed      aspirin 81 MG tablet Take 81 mg by mouth daily.  traZODone (DESYREL) 100 MG tablet Take 100 mg by mouth nightly.  levocetirizine (XYZAL) 5 MG tablet Take 5 mg by mouth nightly.  escitalopram (LEXAPRO) 20 MG tablet Take 20 mg by mouth daily.  LEVOTHYROXINE SODIUM 200 mg daily 200 mg and 50 mg      gabapentin (NEURONTIN) 300 MG capsule Take 400 mg by mouth 3 times daily.        Multiple Vitamins-Minerals (MULTIVITAMIN PO) Take  by mouth. No current facility-administered medications for this visit. IMPRESSIONS:      Kidney disease: CKD stage G-IIIB-A1  Recovery from MASSIMO due to dehydration  Anemia: Anemia remains stable. No need for an erythrocyte stimulating agent (RAMONA). Bone and mineral metabolism: Anemia remains stable. No need for an erythrocyte stimulating agent (RAMONA). PLAN:  1. We discussed the eGFR today. 2. We will continue all current medications without changes. 3. Consult Dr. Raffi Lester for blood stool and anemia. 4. Checking protein/creatinine ratio. 5. Triamcinolone cream for scalp rash. 6. We will see the patient back in 6 months.       _________________________________  Sunitha Wilson.  Nicole Gaston DO  [facility]      Kadeem Mccord MD

## 2021-11-09 NOTE — PATIENT INSTRUCTIONS
KNOW YOUR KIDNEY NUMBERS    Your kidney speed (eGFR) was 48 ml/min this visit (normal is  ml/min)(Ml/min=milliliters of blood filtered per minute). The higher this number is, the better your kidney function is. Your serum creatinine was 1.2 (normal 0.8-1.2 mg/dl at most labs). The higher this number is, the worse your kidney function is. You are in stage G-IIIB-A1 of chronic kidney disease. Your kidney function has improved as compared to your last visit. Your last eGFR was  37 Ml/Min. Stages of kidney disease  EGFR (estimated glomerular filtration rate)  G-I > 90 ml/min Kidney damage with normal kidney function (blood or protein in the urine)  G-II 60-89 ml/min Normal kidney function with mild damage with or without blood or protein in the urine  G-IIIA 45-59 ml/min Mild to moderate loss of kidney function. G-IIIB 30-44 ml/min Moderate to severe loss of kidney function  G-IV 15-29 ml/min Severe loss of kidney function  G-V < 15 mlmin     May need dialysis or kidney transplant    ACR (urine albumin/creatinine ratio) (Mg/Gm)  A-1      ACR<30 Normal to mildly increased protein in the urine. A-2 ACR  Moderate increase in urine protein loss. A-3 ACR >300 Severe increase in urine protein loss    Our goal is to keep your eGFR going as fast as possible ( ml/min is normal). If your eGFR declines to 15-24 ml/min and stays there without recovery,  we will begin to educate you about dialysis or kidney transplant. We also want to keep the protein in your urine as low as possible. The leading cause of kidney disease in the world is diabetes mellitus. Keep your sugar  as much as possible. The second leading cause is hypertension. Keep Your blood pressure 120-140/70-80 as much as possible. If you need refills, call the office or your drug store. You may call the office any time with any questions or concerns. We use Epic software to manage your private patient data securely.  Any health care provider or hospital in the world that uses Epic software can see your data if they have the appropriate credentials. DUE TO THE CORONAVIRUS CONCERN, PLEASE LIMIT YOUR TIME IN PUBLIC. [address] YOUR HANDS COMPLETELY AND FREQUENTLY. Darvin Al

## 2022-03-30 LAB
BASOPHILS ABSOLUTE: 0.06 /ΜL
BASOPHILS RELATIVE PERCENT: 0.7 %
BUN BLDV-MCNC: 22 MG/DL
CALCIUM SERPL-MCNC: 8.8 MG/DL
CHLORIDE BLD-SCNC: 96 MMOL/L
CO2: 35 MMOL/L
CREAT SERPL-MCNC: 1.8 MG/DL
EOSINOPHILS ABSOLUTE: 0.21 /ΜL
EOSINOPHILS RELATIVE PERCENT: 2.3 %
GFR CALCULATED: 30
GLUCOSE BLD-MCNC: 254 MG/DL
HCT VFR BLD CALC: 31.3 % (ref 36–46)
HEMOGLOBIN: 8.6 G/DL (ref 12–16)
LYMPHOCYTES ABSOLUTE: 1.24 /ΜL
LYMPHOCYTES RELATIVE PERCENT: 13.5 %
MCH RBC QN AUTO: 22.3 PG
MCHC RBC AUTO-ENTMCNC: 27.4 G/DL
MCV RBC AUTO: 81.2 FL
MONOCYTES ABSOLUTE: 0.55 /ΜL
MONOCYTES RELATIVE PERCENT: 6 %
NEUTROPHILS ABSOLUTE: 7.01 /ΜL
NEUTROPHILS RELATIVE PERCENT: 76.3 %
PLATELET # BLD: 300 K/ΜL
PMV BLD AUTO: 9.9 FL
POTASSIUM SERPL-SCNC: 4 MMOL/L
RBC # BLD: 3.85 10^6/ΜL
SODIUM BLD-SCNC: 141 MMOL/L
WBC # BLD: 9.18 10^3/ML

## 2022-04-11 ENCOUNTER — TELEPHONE (OUTPATIENT)
Dept: NEPHROLOGY | Age: 62
End: 2022-04-11

## 2022-04-11 DIAGNOSIS — N18.32 ANEMIA IN STAGE 3B CHRONIC KIDNEY DISEASE (HCC): ICD-10-CM

## 2022-04-11 DIAGNOSIS — E11.29 DIABETES MELLITUS WITH MICROALBUMINURIA (HCC): ICD-10-CM

## 2022-04-11 DIAGNOSIS — N18.31 ANEMIA IN STAGE 3A CHRONIC KIDNEY DISEASE (HCC): ICD-10-CM

## 2022-04-11 DIAGNOSIS — N18.32 STAGE 3B CHRONIC KIDNEY DISEASE (HCC): Primary | ICD-10-CM

## 2022-04-11 DIAGNOSIS — D63.1 ANEMIA IN STAGE 3A CHRONIC KIDNEY DISEASE (HCC): ICD-10-CM

## 2022-04-11 DIAGNOSIS — R80.9 DIABETES MELLITUS WITH MICROALBUMINURIA (HCC): ICD-10-CM

## 2022-04-11 DIAGNOSIS — D63.1 ANEMIA IN STAGE 3B CHRONIC KIDNEY DISEASE (HCC): ICD-10-CM

## 2022-04-11 DIAGNOSIS — E11.21 DM KIDNEY DISEASE (HCC): ICD-10-CM

## 2022-04-11 NOTE — TELEPHONE ENCOUNTER
Bmp, cbc, PTH, phosphorus, urine microalbumin/creat ratio, iron saturation and ferritin. Her hemoglobin is dropping on labs is she seeing anyone for this?   If not then would like to evaluate it sooner than July so let me know and I will order some labs for sooner as well

## 2022-04-11 NOTE — TELEPHONE ENCOUNTER
Per patient phone call, she used to see Dr. Misa Higuera and has rescheduled her last 2 appointments (Feb. & April) with Dr. Yasemin Horner. She is now scheduled for 7.7.22 but has no lab orders for that visit. If any labs are needed please mail orders to patient.

## 2022-04-12 NOTE — TELEPHONE ENCOUNTER
Check occult stools x 2 and repeat a hemoglobin as well as iron saturation, ferritin, Vitamin V85, and folic acid

## 2022-04-12 NOTE — TELEPHONE ENCOUNTER
Patient aware and labs faxed to [facility]. Spoke to Erlin Brewster, Home Health Nurse. Faxed all orders to [phone].

## 2022-04-12 NOTE — TELEPHONE ENCOUNTER
Spoke with patient. She is not following anyone for her hemoglobin. Please advise. July appt labs were mailed to patient, I told her I would call her back with current orders to be done soon.

## 2022-04-19 LAB
FOLATE: >24
HCT VFR BLD CALC: 33.3 % (ref 36–46)
HCT VFR BLD CALC: 9 % (ref 36–46)
HEMOGLOBIN: 33.3 G/DL (ref 12–16)
HEMOGLOBIN: 9 G/DL (ref 12–16)
IRON: 51
VITAMIN B-12: 511
VITAMIN B-12: 511

## 2022-04-20 ENCOUNTER — TELEPHONE (OUTPATIENT)
Dept: NEPHROLOGY | Age: 62
End: 2022-04-20

## 2022-04-20 DIAGNOSIS — D63.1 ANEMIA IN STAGE 3B CHRONIC KIDNEY DISEASE (HCC): Primary | ICD-10-CM

## 2022-04-20 DIAGNOSIS — N18.32 ANEMIA IN STAGE 3B CHRONIC KIDNEY DISEASE (HCC): Primary | ICD-10-CM

## 2022-04-20 NOTE — TELEPHONE ENCOUNTER
----- Message from Alejandro Dye DO sent at 4/20/2022 11:07 AM EDT -----  Labs reviewed. Arrange for Aranesp 100 mcg SC x 1.  Repeat H&H 1 month  ----- Message -----  From: Steve Mix CMA  Sent: 4/19/2022   4:08 PM EDT  To: Alejandro Dye DO

## 2022-04-21 NOTE — TELEPHONE ENCOUNTER
Faxed order to [facility] they will sechedule.  I put note to schedule same day as aranesp

## 2022-04-21 NOTE — TELEPHONE ENCOUNTER
Just seeing her ferritin level and would also like her to get 1 dose of IV iron can be given same day as Aranesp.    Either Injectafer 750 mg x 1 or If not covered can give Venofer 300 mg IV x 1

## 2022-05-18 ENCOUNTER — TELEPHONE (OUTPATIENT)
Dept: NEPHROLOGY | Age: 62
End: 2022-05-18

## 2022-05-18 DIAGNOSIS — N18.32 ANEMIA IN STAGE 3B CHRONIC KIDNEY DISEASE (HCC): Primary | ICD-10-CM

## 2022-05-18 DIAGNOSIS — D63.1 ANEMIA IN STAGE 3B CHRONIC KIDNEY DISEASE (HCC): Primary | ICD-10-CM

## 2022-05-18 LAB
HCT VFR BLD CALC: 33.9 % (ref 36–46)
HEMOGLOBIN: 9.6 G/DL (ref 12–16)

## 2022-05-18 NOTE — TELEPHONE ENCOUNTER
----- Message from Davon Caldwell DO sent at 5/18/2022  3:24 PM EDT -----  Hgb still low but is improving. Arrange for another dose of aranesp 100 mcg SC x 1. Start oral iron ferrous sulfate 325 mg daily.    Prior to next appt please make sure orders include cbc and iron saturation  ----- Message -----  From: Maury Gregg CMA  Sent: 5/18/2022   3:16 PM EDT  To: Davon Caldwell DO

## 2022-05-18 NOTE — TELEPHONE ENCOUNTER
Spoke to Chantal Ugarte and informed him of faxing aranesp order to [facility]. He told me  will do her oral iron. I am going to fax lab orders to them with the note for oral iron.

## 2022-05-19 NOTE — TELEPHONE ENCOUNTER
Attempted to call patient to inform her that home health does not do it . We went through this on the last dose she had. I will continue to reach out to patient.

## 2022-05-19 NOTE — TELEPHONE ENCOUNTER
Placed call to patient and Amish at [facility] to reach back out to get her scheduled since [facility] does not do it.

## 2022-05-19 NOTE — TELEPHONE ENCOUNTER
Spoke to Amish at [facility] [employer]. She had attempted to schedule patient to receive Aranesp, however patient wanted to have this completed through home health. Amish told her she was uncertain this could be completed but would let us know. Aliza Godwin will hold on to order, if it does need scheduled, we just need to let them  Know.

## 2022-05-19 NOTE — TELEPHONE ENCOUNTER
Patient called back and states she is with a different home health . Xochilt Helton is her CNP. I left a message for Charissa which is the care coordinator asking if they could give it to her in the home setting.

## 2022-05-23 RX ORDER — FERROUS SULFATE 325(65) MG
325 TABLET ORAL
COMMUNITY
End: 2022-05-23 | Stop reason: SDUPTHER

## 2022-05-23 RX ORDER — FERROUS SULFATE 325(65) MG
325 TABLET ORAL
Qty: 90 TABLET | Refills: 3 | Status: SHIPPED | OUTPATIENT
Start: 2022-05-23

## 2022-05-23 RX ORDER — DOCUSATE SODIUM 100 MG/1
100 CAPSULE, LIQUID FILLED ORAL 2 TIMES DAILY
Qty: 30 CAPSULE | Refills: 3 | Status: SHIPPED | OUTPATIENT
Start: 2022-05-23 | End: 2022-06-22

## 2022-05-23 NOTE — TELEPHONE ENCOUNTER
Home health called and asked if we can send these to pharmacy due to insurance will pay for them. Due to the iron causing constipation is why they asked for a stool softner.

## 2022-06-27 LAB
PHOSPHORUS: NORMAL
PTH INTACT: 229.1

## 2022-06-28 ENCOUNTER — TELEPHONE (OUTPATIENT)
Dept: NEPHROLOGY | Age: 62
End: 2022-06-28

## 2022-06-28 LAB
BASOPHILS ABSOLUTE: ABNORMAL
BASOPHILS RELATIVE PERCENT: ABNORMAL
BUN BLDV-MCNC: 20 MG/DL
CALCIUM SERPL-MCNC: 9.4 MG/DL
CHLORIDE BLD-SCNC: 96 MMOL/L
CO2: 37 MMOL/L
CREAT SERPL-MCNC: 1.8 MG/DL
EOSINOPHILS ABSOLUTE: ABNORMAL
EOSINOPHILS RELATIVE PERCENT: ABNORMAL
GFR CALCULATED: 30
GLUCOSE BLD-MCNC: 232 MG/DL
HCT VFR BLD CALC: 32.4 % (ref 36–46)
HEMOGLOBIN: 9 G/DL (ref 12–16)
IRON SATURATION: 10
IRON: 36
LYMPHOCYTES ABSOLUTE: ABNORMAL
LYMPHOCYTES RELATIVE PERCENT: ABNORMAL
MCH RBC QN AUTO: ABNORMAL PG
MCHC RBC AUTO-ENTMCNC: ABNORMAL G/DL
MCV RBC AUTO: ABNORMAL FL
MONOCYTES ABSOLUTE: ABNORMAL
MONOCYTES RELATIVE PERCENT: ABNORMAL
NEUTROPHILS ABSOLUTE: ABNORMAL
NEUTROPHILS RELATIVE PERCENT: ABNORMAL
PLATELET # BLD: 312 K/ΜL
PMV BLD AUTO: ABNORMAL FL
POTASSIUM SERPL-SCNC: 4.3 MMOL/L
RBC # BLD: 3.83 10^6/ΜL
SODIUM BLD-SCNC: 141 MMOL/L
TOTAL IRON BINDING CAPACITY: 346
WBC # BLD: 11.24 10^3/ML

## 2022-06-28 NOTE — TELEPHONE ENCOUNTER
----- Message from Adi Mckeon MD sent at 6/28/2022  3:01 PM EDT -----  Can we see if we can add a ferritin level as well to the labs done today

## 2022-06-29 LAB
CREATININE, URINE: 22.1
FERRITIN: 70 NG/ML (ref 9–150)
MICROALBUMIN/CREAT 24H UR: <3 MG/G{CREAT}
MICROALBUMIN/CREAT UR-RTO: NORMAL

## 2022-06-30 ENCOUNTER — TELEPHONE (OUTPATIENT)
Dept: NEPHROLOGY | Age: 62
End: 2022-06-30

## 2022-06-30 NOTE — TELEPHONE ENCOUNTER
----- Message from Venkata Caldera MD sent at 6/30/2022 11:07 AM EDT -----  Please schedule her to receive 2 doses of IV iron Injectafer

## 2022-06-30 NOTE — TELEPHONE ENCOUNTER
Attempted to contact pt to inform her but a gentlemen answered stating I had the wrong number. Sent order to [facility] to have them schedule. Also reaced out to [facility] to see about another number.

## 2022-07-01 NOTE — TELEPHONE ENCOUNTER
Patient returned call, stated the lady that had called and spoke to Dulce Maria asked for Johny Cunha, which is patient's twin sister. Stated it confused him. She is aware of the order and that trish will be contacting for scheduling.

## 2022-07-07 ENCOUNTER — OFFICE VISIT (OUTPATIENT)
Dept: NEPHROLOGY | Age: 62
End: 2022-07-07
Payer: MEDICARE

## 2022-07-07 VITALS
DIASTOLIC BLOOD PRESSURE: 68 MMHG | OXYGEN SATURATION: 95 % | WEIGHT: 293 LBS | HEART RATE: 74 BPM | SYSTOLIC BLOOD PRESSURE: 130 MMHG | BODY MASS INDEX: 56.5 KG/M2

## 2022-07-07 DIAGNOSIS — N18.32 ANEMIA IN STAGE 3B CHRONIC KIDNEY DISEASE (HCC): Primary | ICD-10-CM

## 2022-07-07 DIAGNOSIS — N18.32 STAGE 3B CHRONIC KIDNEY DISEASE (HCC): ICD-10-CM

## 2022-07-07 DIAGNOSIS — D63.1 ANEMIA IN STAGE 3B CHRONIC KIDNEY DISEASE (HCC): Primary | ICD-10-CM

## 2022-07-07 PROCEDURE — G8417 CALC BMI ABV UP PARAM F/U: HCPCS | Performed by: INTERNAL MEDICINE

## 2022-07-07 PROCEDURE — 1036F TOBACCO NON-USER: CPT | Performed by: INTERNAL MEDICINE

## 2022-07-07 PROCEDURE — 3017F COLORECTAL CA SCREEN DOC REV: CPT | Performed by: INTERNAL MEDICINE

## 2022-07-07 PROCEDURE — 99214 OFFICE O/P EST MOD 30 MIN: CPT | Performed by: INTERNAL MEDICINE

## 2022-07-07 PROCEDURE — G8427 DOCREV CUR MEDS BY ELIG CLIN: HCPCS | Performed by: INTERNAL MEDICINE

## 2022-07-07 RX ORDER — CALCITRIOL 0.25 UG/1
0.25 CAPSULE, LIQUID FILLED ORAL
Qty: 30 CAPSULE | Refills: 5 | Status: SHIPPED | OUTPATIENT
Start: 2022-07-08 | End: 2022-08-07

## 2022-07-07 RX ORDER — CALCITRIOL 0.25 UG/1
CAPSULE, LIQUID FILLED ORAL
Qty: 38 CAPSULE | OUTPATIENT
Start: 2022-07-07

## 2022-07-07 RX ORDER — BUMETANIDE 1 MG/1
TABLET ORAL
COMMUNITY
Start: 2022-06-14 | End: 2022-07-07 | Stop reason: SDUPTHER

## 2022-07-07 RX ORDER — BUMETANIDE 1 MG/1
2 TABLET ORAL 2 TIMES DAILY
Qty: 360 TABLET | Refills: 1 | Status: SHIPPED | OUTPATIENT
Start: 2022-07-07 | End: 2022-10-05

## 2022-07-07 NOTE — PATIENT INSTRUCTIONS
Check to see if you are taking diclofenac (also known as voltaren) and make sure not taking it.    Increase bumex to 2 mg in AM, 1 mg in PM  Start calcitriol three times weekly  Labs in 1 month after finishing iron

## 2022-07-07 NOTE — PROGRESS NOTES
[address] [address] [address] [address]  Dept: [phone]  Loc: [phone]  Progress Note  2022 1:55 PM      Pt Name:    Tila Notch:    1960  Primary Care Physician:  Katy Gomez MD     Chief Complaint:   Chief Complaint   Patient presents with    Follow-up     CKD III Dr. Alexis Block         History of Present Illness: This is a follow-up visit for CKD III. She is a former Dr. Alexis Block patient, this is the first time I am seeing her. She has history of DM, HTN, HLD, lupus,COPD on Home O2, diastolic CHF  EF 40%, hx CVA. She is having issues with water retention, currently taking bumex 1 mg BID. She will sometimes increase to 2 mg in AM, 1 mg in PM which helps but then she swells right back up again when going back to 1 mg BID. Pertinent items are noted in HPI. Past History:  Past Medical History:   Diagnosis Date    M[facility]IMO [facility]) ([facility] [address]     Anxiety     Asthma     Depression     Edema     GERD (gastroesophageal reflux disease)     Headache(784.0)     Hyperlipidemia     Hypertension     Lupus (HCC)     Type II or unspecified type diabetes mellitus without mention of complication, not stated as uncontrolled      Past Surgical History:   Procedure Laterality Date     SECTION  [id number],9522    CHOLECYSTECTOMY      COLONOSCOPY  2015    ELBOW SURGERY      pinched nerve    FOOT SURGERY      HYSTERECTOMY      UPPER GASTROINTESTINAL ENDOSCOPY  2015        VITALS:  Wt 299 lb (135.6 kg)   BMI 56.50 kg/m²   Wt Readings from Last 3 Encounters:   22 299 lb (135.6 kg)   21 229 lb (103.9 kg)   21 280 lb (127 kg)     Body mass index is 56.5 kg/m².      General Appearance: alert and cooperative with exam, appears comfortable, no distress  HEENT: EOMI, moist oral mucus membranes  Neck: No jugular venous distention  Lungs: diminished, scant wheezes  Heart: S1, S2 heard, no rub  GI: soft, non-tender, no guarding,   Extremities: 2-3+ LE edema, left > right  Skin: warm, dry  Neurologic: no tremor, no asterixis,     Medications:  Current Outpatient Medications   Medication Sig Dispense Refill    bumetanide (BUMEX) 1 MG tablet TAKE 1 TABLET BY MOUTH TWICE DAILY      ferrous sulfate (IRON 325) 325 (65 Fe) MG tablet Take 1 tablet by mouth daily (with breakfast) 90 tablet 3    triamcinolone (KENALOG) 0.025 % cream Apply topically 2 times daily to affected areas for 30 days without stopping. 45 g 5    Albuterol Sulfate (PROAIR HFA IN) Inhale into the lungs      Continuous Blood Gluc Sensor (FREESTYLE KEILA 14 DAY SENSOR) MISC 1 Units by Does not apply route 4 times daily 2 each 12    Continuous Blood Gluc  (FREESTYLE KEILA 14 DAY READER) RADHA 1 Units by Does not apply route 4 times daily 1 Device 2    HYDROXYCHLOROQUINE SULFATE PO Take 400 mg by mouth daily      acetaminophen (TYLENOL) 325 MG tablet Take 650 mg by mouth every 6 hours as needed for Pain      atorvastatin (LIPITOR) 40 MG tablet Take 40 mg by mouth daily      pantoprazole (PROTONIX) 40 MG tablet Take 40 mg by mouth daily      losartan (COZAAR) 100 MG tablet Take 100 mg by mouth daily      NIFEdipine (ADALAT CC) 90 MG extended release tablet Take 90 mg by mouth daily      betamethasone dipropionate (DIPROLENE) 0.05 % ointment Apply topically 2 times daily Apply topically 2 times daily.       diclofenac sodium (VOLTAREN) 1 % GEL Apply 2 g topically 3 times daily      sucralfate (CARAFATE) 1 GM tablet Take 1 g by mouth 4 times daily      spironolactone (ALDACTONE) 25 MG tablet Take 25 mg by mouth 2 times daily      famotidine (PEPCID) 20 MG tablet Take 20 mg by mouth 2 times daily      Nystatin 0141796 units CAPS Take by mouth      diclofenac (VOLTAREN) 75 MG EC tablet Take 75 mg by mouth 2 times daily      clonazePAM (KLONOPIN) 0.5 MG tablet Take 0.5 mg by mouth 2 times daily as needed      meclizine (ANTIVERT) 25 MG tablet Take 25 mg by mouth as needed      aspirin 81 MG tablet Take 81 mg by mouth daily.  traZODone (DESYREL) 100 MG tablet Take 100 mg by mouth nightly.  levocetirizine (XYZAL) 5 MG tablet Take 5 mg by mouth nightly.  escitalopram (LEXAPRO) 20 MG tablet Take 20 mg by mouth daily.  gabapentin (NEURONTIN) 300 MG capsule Take 400 mg by mouth 3 times daily.  Multiple Vitamins-Minerals (MULTIVITAMIN PO) Take  by mouth.  LEVOTHYROXINE SODIUM 200 mg daily 200 mg and 50 mg       No current facility-administered medications for this visit. Laboratory & Diagnostics:  CBC:   Lab Results   Component Value Date    WBC 11.24 06/28/2022    HGB 9.0 (A) 06/28/2022    HCT 32.4 (A) 06/28/2022    MCV 81.2 03/30/2022     06/28/2022     BMP:    Lab Results   Component Value Date     06/28/2022     03/30/2022     10/20/2021    K 4.3 06/28/2022    K 4.0 03/30/2022    K 4.9 10/20/2021    CL 96 06/28/2022    CL 96 03/30/2022     10/20/2021    CO2 37 06/28/2022    CO2 35 03/30/2022    CO2 29 10/20/2021    BUN 20 06/28/2022    BUN 22 03/30/2022    BUN 14 10/20/2021    CREATININE 1.8 06/28/2022    CREATININE 1.8 03/30/2022    CREATININE 1.2 10/20/2021    GLUCOSE 232 06/28/2022    GLUCOSE 254 03/30/2022    GLUCOSE 231 10/20/2021      Hepatic: No results found for: AST, ALT, ALB, BILITOT, ALKPHOS  BNP: No results found for: BNP  Lipids: No results found for: CHOL, HDL  INR: No results found for: INR  URINE: No results found for: NAUR, PROTUR  No results found for: NITRU, COLORU, PHUR, LABCAST, WBCUA, RBCUA, MUCUS, TRICHOMONAS, YEAST, BACTERIA, CLARITYU, SPECGRAV, LEUKOCYTESUR, UROBILINOGEN, BILIRUBINUR, BLOODU, GLUCOSEU, KETUA, AMORPHOUS   Microalbumen/Creatinine ratio:  No components found for: RUCREAT        Impression/Plan:   1. CKD III d/t diabetic nephropathy, hypertensive nephrosclerosis: labs running slightly worse. Volume status worse. Increase bumex to 2/1 mg BID . She has po voltaren listed on home med list but doesn't think she's taking it. Will check when she gets home  Goals of care include slowing rate of progression by controlling blood pressure, blood glucoses and albuminuria and by avoiding nephrotoxins such as NSAIDs and IV contrast.  2. Iron deficiency anemia: injectafer ordered. Repeat next month. Occult stools were negative  3. Secondary hyperparathyroidism: start calcitriol 3x weekly  4. HTN  5. DM  6. COPD on Home O2  7. Volume overload  8. SLE        Bloodwork and medications were reviewed and plan of care discussed with the patient. Return to clinic in 3 months  or sooner if the need arises.       Ken Sutherland, DO  [facility]

## 2022-09-27 LAB
BASOPHILS ABSOLUTE: ABNORMAL
BASOPHILS RELATIVE PERCENT: ABNORMAL
BUN BLDV-MCNC: 21 MG/DL
CALCIUM SERPL-MCNC: 9.3 MG/DL
CHLORIDE BLD-SCNC: 96 MMOL/L
CO2: 40 MMOL/L
CREAT SERPL-MCNC: 1.8 MG/DL
EOSINOPHILS ABSOLUTE: ABNORMAL
EOSINOPHILS RELATIVE PERCENT: ABNORMAL
GFR CALCULATED: 30
GLUCOSE BLD-MCNC: 237 MG/DL
HCT VFR BLD CALC: 36.2 % (ref 36–46)
HEMOGLOBIN: 10.2 G/DL (ref 12–16)
IRON SATURATION: 12
IRON: 34
LYMPHOCYTES ABSOLUTE: ABNORMAL
LYMPHOCYTES RELATIVE PERCENT: ABNORMAL
MCH RBC QN AUTO: ABNORMAL PG
MCHC RBC AUTO-ENTMCNC: ABNORMAL G/DL
MCV RBC AUTO: ABNORMAL FL
MONOCYTES ABSOLUTE: ABNORMAL
MONOCYTES RELATIVE PERCENT: ABNORMAL
NEUTROPHILS ABSOLUTE: ABNORMAL
NEUTROPHILS RELATIVE PERCENT: ABNORMAL
PHOSPHORUS: 4 MG/DL
PLATELET # BLD: 276 K/ΜL
PMV BLD AUTO: ABNORMAL FL
POTASSIUM SERPL-SCNC: 4.9 MMOL/L
PTH INTACT: 117.4
RBC # BLD: 4.1 10^6/ΜL
SODIUM BLD-SCNC: 140 MMOL/L
TOTAL IRON BINDING CAPACITY: 278
VITAMIN D 25-HYDROXY: 16.4
VITAMIN D2, 25 HYDROXY: NORMAL
VITAMIN D3,25 HYDROXY: NORMAL
WBC # BLD: 9.59 10^3/ML

## 2022-10-04 ENCOUNTER — TELEPHONE (OUTPATIENT)
Dept: NEPHROLOGY | Age: 62
End: 2022-10-04

## 2022-10-04 DIAGNOSIS — E55.9 VITAMIN D DEFICIENCY: ICD-10-CM

## 2022-10-04 DIAGNOSIS — E11.9 CONTROLLED TYPE 2 DIABETES MELLITUS WITHOUT COMPLICATION, UNSPECIFIED WHETHER LONG TERM INSULIN USE (HCC): Primary | ICD-10-CM

## 2022-10-04 DIAGNOSIS — N18.32 ANEMIA IN STAGE 3B CHRONIC KIDNEY DISEASE (HCC): ICD-10-CM

## 2022-10-04 DIAGNOSIS — I50.9 OTHER CONGESTIVE HEART FAILURE (HCC): ICD-10-CM

## 2022-10-04 DIAGNOSIS — D63.1 ANEMIA IN STAGE 3B CHRONIC KIDNEY DISEASE (HCC): ICD-10-CM

## 2022-10-04 DIAGNOSIS — N18.32 STAGE 3B CHRONIC KIDNEY DISEASE (HCC): ICD-10-CM

## 2022-10-04 DIAGNOSIS — E03.9 ACQUIRED HYPOTHYROIDISM: ICD-10-CM

## 2022-10-04 NOTE — TELEPHONE ENCOUNTER
I spoke to [place]. She said she had her labs done but she cannot get out. She has a cast on her leg. Waiting for a ramp to be installed and many things going on. Would you want to do a virtual visit or review the labs and see her when you get back?

## 2022-10-12 NOTE — TELEPHONE ENCOUNTER
Atilio Hernández called back. I placed orders for next year and scheduled her an appointment for next year.

## 2023-04-04 ENCOUNTER — TELEPHONE (OUTPATIENT)
Dept: NEPHROLOGY | Age: 63
End: 2023-04-04

## 2023-04-05 DIAGNOSIS — N18.32 STAGE 3B CHRONIC KIDNEY DISEASE (HCC): ICD-10-CM

## 2023-04-05 DIAGNOSIS — E11.9 CONTROLLED TYPE 2 DIABETES MELLITUS WITHOUT COMPLICATION, UNSPECIFIED WHETHER LONG TERM INSULIN USE (HCC): Primary | ICD-10-CM

## 2023-04-05 DIAGNOSIS — E55.9 VITAMIN D DEFICIENCY: ICD-10-CM

## 2023-04-05 DIAGNOSIS — I50.9 OTHER CONGESTIVE HEART FAILURE (HCC): ICD-10-CM

## 2023-04-05 DIAGNOSIS — E03.9 ACQUIRED HYPOTHYROIDISM: ICD-10-CM

## 2023-04-05 DIAGNOSIS — D63.1 ANEMIA IN STAGE 3B CHRONIC KIDNEY DISEASE (HCC): ICD-10-CM

## 2023-04-05 DIAGNOSIS — N18.32 ANEMIA IN STAGE 3B CHRONIC KIDNEY DISEASE (HCC): ICD-10-CM

## 2023-04-05 RX ORDER — LEVOTHYROXINE SODIUM 0.05 MG/1
50 TABLET ORAL DAILY
COMMUNITY

## 2023-04-05 RX ORDER — DULAGLUTIDE 1.5 MG/.5ML
INJECTION, SOLUTION SUBCUTANEOUS
COMMUNITY
Start: 2023-03-14

## 2023-04-05 RX ORDER — FLUTICASONE PROPIONATE 50 MCG
SPRAY, SUSPENSION (ML) NASAL
COMMUNITY
Start: 2023-03-31

## 2023-04-05 RX ORDER — BENZONATATE 200 MG/1
200 CAPSULE ORAL 3 TIMES DAILY
COMMUNITY
Start: 2021-10-06

## 2023-04-05 RX ORDER — CLOTRIMAZOLE 1 %
CREAM (GRAM) TOPICAL
COMMUNITY
Start: 2021-12-08

## 2023-04-05 RX ORDER — HYDROCODONE BITARTRATE AND ACETAMINOPHEN 5; 325 MG/1; MG/1
TABLET ORAL
COMMUNITY
Start: 2023-03-21

## 2023-04-05 RX ORDER — CETIRIZINE HYDROCHLORIDE 10 MG/1
10 TABLET ORAL DAILY
COMMUNITY
Start: 2023-02-28

## 2023-04-05 RX ORDER — METOLAZONE 2.5 MG/1
2.5 TABLET ORAL EVERY OTHER DAY
COMMUNITY
End: 2023-04-11 | Stop reason: SDUPTHER

## 2023-04-07 LAB
BASOPHILS ABSOLUTE: ABNORMAL
BASOPHILS RELATIVE PERCENT: ABNORMAL
BUN BLDV-MCNC: 24 MG/DL
CALCIUM SERPL-MCNC: 8.7 MG/DL
CHLORIDE BLD-SCNC: 92 MMOL/L
CO2: 39 MMOL/L
CREAT SERPL-MCNC: 2.1 MG/DL
EGFR: 25
EOSINOPHILS ABSOLUTE: ABNORMAL
EOSINOPHILS RELATIVE PERCENT: ABNORMAL
GLUCOSE BLD-MCNC: 180 MG/DL
HCT VFR BLD CALC: 30.1 % (ref 36–46)
HEMOGLOBIN: 8.1 G/DL (ref 12–16)
IRON SATURATION: 14
IRON: 40
IRON: NORMAL
LYMPHOCYTES ABSOLUTE: ABNORMAL
LYMPHOCYTES RELATIVE PERCENT: ABNORMAL
MCH RBC QN AUTO: ABNORMAL PG
MCHC RBC AUTO-ENTMCNC: ABNORMAL G/DL
MCV RBC AUTO: ABNORMAL FL
MONOCYTES ABSOLUTE: ABNORMAL
MONOCYTES RELATIVE PERCENT: ABNORMAL
NEUTROPHILS ABSOLUTE: ABNORMAL
NEUTROPHILS RELATIVE PERCENT: ABNORMAL
PDW BLD-RTO: ABNORMAL %
PHOSPHORUS: 4.5 MG/DL
PLATELET # BLD: 311 K/ΜL
PMV BLD AUTO: ABNORMAL FL
POTASSIUM SERPL-SCNC: 4.7 MMOL/L
PTH INTACT: 124.8
RBC # BLD: 3.75 10^6/ΜL
SODIUM BLD-SCNC: 136 MMOL/L
TOTAL IRON BINDING CAPACITY: 287
VITAMIN D 25-HYDROXY: 17.2
VITAMIN D2, 25 HYDROXY: NORMAL
VITAMIN D3,25 HYDROXY: NORMAL
WBC # BLD: 8.34 10^3/ML

## 2023-04-12 ENCOUNTER — TELEPHONE (OUTPATIENT)
Dept: NEPHROLOGY | Age: 63
End: 2023-04-12

## 2023-05-11 LAB
BASOPHILS ABSOLUTE: ABNORMAL
BASOPHILS RELATIVE PERCENT: ABNORMAL
BILIRUBIN, URINE: NEGATIVE
BLOOD, URINE: NEGATIVE
BUN BLDV-MCNC: 17 MG/DL
CALCIUM SERPL-MCNC: 9.4 MG/DL
CHLORIDE BLD-SCNC: 89 MMOL/L
CLARITY: CLEAR
CO2: 39 MMOL/L
COLOR: YELLOW
CREAT SERPL-MCNC: 1.6 MG/DL
CREATININE, URINE: 36
EGFR: 34
EOSINOPHILS ABSOLUTE: ABNORMAL
EOSINOPHILS RELATIVE PERCENT: ABNORMAL
GLUCOSE BLD-MCNC: 160 MG/DL
GLUCOSE URINE: NEGATIVE
HCT VFR BLD CALC: 33.6 % (ref 36–46)
HEMOGLOBIN: 8.9 G/DL (ref 12–16)
IRON SATURATION: 19
IRON: 65
KETONES, URINE: NEGATIVE
LEUKOCYTE ESTERASE, URINE: NORMAL
LYMPHOCYTES ABSOLUTE: ABNORMAL
LYMPHOCYTES RELATIVE PERCENT: ABNORMAL
MCH RBC QN AUTO: ABNORMAL PG
MCHC RBC AUTO-ENTMCNC: ABNORMAL G/DL
MCV RBC AUTO: ABNORMAL FL
MICROALBUMIN/CREAT 24H UR: <6 MG/G{CREAT}
MICROALBUMIN/CREAT UR-RTO: 16.7
MONOCYTES ABSOLUTE: ABNORMAL
MONOCYTES RELATIVE PERCENT: ABNORMAL
NEUTROPHILS ABSOLUTE: ABNORMAL
NEUTROPHILS RELATIVE PERCENT: ABNORMAL
NITRITE, URINE: NEGATIVE
PH UA: 7 (ref 4.5–8)
PHOSPHORUS: 4 MG/DL
PLATELET # BLD: 221 K/ΜL
PMV BLD AUTO: ABNORMAL FL
POTASSIUM SERPL-SCNC: 3.9 MMOL/L
PROTEIN UA: NEGATIVE
PTH INTACT: 109.5
RBC # BLD: 3.9 10^6/ΜL
SODIUM BLD-SCNC: 139 MMOL/L
SPECIFIC GRAVITY, URINE: 1.01
TOTAL IRON BINDING CAPACITY: 336
UROBILINOGEN, URINE: NORMAL
VITAMIN D 25-HYDROXY: 25.4
VITAMIN D2, 25 HYDROXY: NORMAL
VITAMIN D3,25 HYDROXY: NORMAL
WBC # BLD: 8.14 10^3/ML

## 2023-05-17 ENCOUNTER — TELEPHONE (OUTPATIENT)
Dept: NEPHROLOGY | Age: 63
End: 2023-05-17

## 2023-05-23 ENCOUNTER — TELEMEDICINE (OUTPATIENT)
Dept: NEPHROLOGY | Age: 63
End: 2023-05-23
Payer: MEDICARE

## 2023-05-23 DIAGNOSIS — D63.1 ANEMIA IN STAGE 3B CHRONIC KIDNEY DISEASE (HCC): ICD-10-CM

## 2023-05-23 DIAGNOSIS — N18.32 STAGE 3B CHRONIC KIDNEY DISEASE (HCC): Primary | ICD-10-CM

## 2023-05-23 DIAGNOSIS — N18.32 ANEMIA IN STAGE 3B CHRONIC KIDNEY DISEASE (HCC): ICD-10-CM

## 2023-05-23 PROCEDURE — 99214 OFFICE O/P EST MOD 30 MIN: CPT | Performed by: INTERNAL MEDICINE

## 2023-05-23 PROCEDURE — G8427 DOCREV CUR MEDS BY ELIG CLIN: HCPCS | Performed by: INTERNAL MEDICINE

## 2023-05-23 PROCEDURE — 1090F PRES/ABSN URINE INCON ASSESS: CPT | Performed by: INTERNAL MEDICINE

## 2023-05-23 PROCEDURE — G8400 PT W/DXA NO RESULTS DOC: HCPCS | Performed by: INTERNAL MEDICINE

## 2023-05-23 PROCEDURE — G8417 CALC BMI ABV UP PARAM F/U: HCPCS | Performed by: INTERNAL MEDICINE

## 2023-05-23 PROCEDURE — 3017F COLORECTAL CA SCREEN DOC REV: CPT | Performed by: INTERNAL MEDICINE

## 2023-05-23 PROCEDURE — 1123F ACP DISCUSS/DSCN MKR DOCD: CPT | Performed by: INTERNAL MEDICINE

## 2023-05-23 PROCEDURE — 1036F TOBACCO NON-USER: CPT | Performed by: INTERNAL MEDICINE

## 2023-05-23 RX ORDER — METOLAZONE 2.5 MG/1
2.5 TABLET ORAL
Qty: 8 TABLET | Refills: 5
Start: 2023-05-25 | End: 2023-06-24

## 2023-05-23 NOTE — PROGRESS NOTES
[address] [place] [address]  [address] [place] [address]  Dept: [phone]  Loc: [phone]  Progress Note  5/23/2023 1:11 PM      TELEHEALTH EVALUATION -- Audio/Visual (During SKBOV-48 public health emergency)     Telehealth service was provided with the patient at her home and myself the physician in my office in [place], [place] and my assistant, Radha Cuenca,  who has initiated the visit. Pursuant to the emergency declaration under the [address] [place] [address], [facility] waiver authority and the [employer] Act, this Virtual  Visit was conducted, with patient's consent, to reduce the patient's risk of exposure to COVID-19 and provide continuity of care for an established patient. Services were provided through a video synchronous discussion virtually to substitute for in-person clinic visit. Pt Name:    Zach Laughter:    1960  Primary Care Physician:  SCOT Dinero NP     Chief Complaint:   No chief complaint on file. History of Present Illness: This is a follow-up visit for CKD III. She is a former Dr. Maryana Momin patient. She has history of DM, HTN, HLD, lupus,COPD on Home O2, diastolic CHF  EF 05%, hx CVA. She is bed bound so we are doing telehealth visit again. She was not able to leave her house to get aranesp injection. She is taking oral iron, hgb is improving  We reduced metolazone to once a week last visit but she feels like she is retaining more fluid with it. Pertinent items are noted in HPI.          Past History:  Past Medical History:   Diagnosis Date    MASSIMO (acute kidney injury) ([address]     Anxiety     Asthma     Depression     Edema     GERD (gastroesophageal reflux disease)     Headache(784.0)     Hyperlipidemia     Hypertension     Lupus ([address]     Type II or unspecified type diabetes mellitus without

## 2023-08-16 RX ORDER — MONTELUKAST SODIUM 10 MG/1
10 TABLET ORAL NIGHTLY
COMMUNITY

## 2023-08-16 RX ORDER — DIPHENHYDRAMINE HCL 25 MG
25 TABLET ORAL 2 TIMES DAILY
COMMUNITY

## 2023-08-16 RX ORDER — CILOSTAZOL 50 MG/1
50 TABLET ORAL 2 TIMES DAILY
COMMUNITY

## 2023-08-18 LAB
BASOPHILS ABSOLUTE: ABNORMAL
BASOPHILS RELATIVE PERCENT: ABNORMAL
BILIRUBIN, URINE: NEGATIVE
BLOOD, URINE: NEGATIVE
BUN BLDV-MCNC: 21 MG/DL
CALCIUM SERPL-MCNC: 8.8 MG/DL
CHLORIDE BLD-SCNC: 92 MMOL/L
CLARITY: CLEAR
CO2: 37 MMOL/L
COLOR: YELLOW
CREAT SERPL-MCNC: 1.5 MG/DL
CREATININE, URINE: 35.9
EGFR: 37
EOSINOPHILS ABSOLUTE: ABNORMAL
EOSINOPHILS RELATIVE PERCENT: ABNORMAL
GLUCOSE BLD-MCNC: 199 MG/DL
GLUCOSE URINE: NEGATIVE
HCT VFR BLD CALC: 29.5 % (ref 36–46)
HEMOGLOBIN: 7.9 G/DL (ref 12–16)
IRON SATURATION: 16
IRON: 55
KETONES, URINE: NEGATIVE
LEUKOCYTE ESTERASE, URINE: NORMAL
LYMPHOCYTES ABSOLUTE: ABNORMAL
LYMPHOCYTES RELATIVE PERCENT: ABNORMAL
MCH RBC QN AUTO: ABNORMAL PG
MCHC RBC AUTO-ENTMCNC: ABNORMAL G/DL
MCV RBC AUTO: ABNORMAL FL
MICROALBUMIN/CREAT 24H UR: 6 MG/G{CREAT}
MICROALBUMIN/CREAT UR-RTO: 16.7
MONOCYTES ABSOLUTE: ABNORMAL
MONOCYTES RELATIVE PERCENT: ABNORMAL
NEUTROPHILS ABSOLUTE: ABNORMAL
NEUTROPHILS RELATIVE PERCENT: ABNORMAL
NITRITE, URINE: NEGATIVE
PDW BLD-RTO: ABNORMAL %
PH UA: 6.2 (ref 4.5–8)
PHOSPHORUS: 3.3 MG/DL
PLATELET # BLD: 310 K/ΜL
PMV BLD AUTO: ABNORMAL FL
POTASSIUM SERPL-SCNC: 4.3 MMOL/L
PROTEIN UA: NEGATIVE
PTH INTACT: 140.4
RBC # BLD: 3.4 10^6/ΜL
SODIUM BLD-SCNC: 139 MMOL/L
SPECIFIC GRAVITY, URINE: 1.01
TOTAL IRON BINDING CAPACITY: 344
UROBILINOGEN, URINE: NORMAL
VITAMIN D 25-HYDROXY: 44.7
VITAMIN D2, 25 HYDROXY: NORMAL
VITAMIN D3,25 HYDROXY: NORMAL
WBC # BLD: 11.02 10^3/ML

## 2023-10-09 RX ORDER — CALCITRIOL 0.25 UG/1
CAPSULE, LIQUID FILLED ORAL
Qty: 36 CAPSULE | Refills: 1 | Status: SHIPPED | OUTPATIENT
Start: 2023-10-09

## 2024-03-25 RX ORDER — CALCITRIOL 0.25 UG/1
CAPSULE, LIQUID FILLED ORAL
Qty: 36 CAPSULE | Refills: 5 | Status: SHIPPED | OUTPATIENT
Start: 2024-03-25